# Patient Record
Sex: FEMALE | Race: WHITE | Employment: OTHER | URBAN - METROPOLITAN AREA
[De-identification: names, ages, dates, MRNs, and addresses within clinical notes are randomized per-mention and may not be internally consistent; named-entity substitution may affect disease eponyms.]

---

## 2020-12-08 ENCOUNTER — HOSPITAL ENCOUNTER (EMERGENCY)
Age: 60
Discharge: HOME | End: 2020-12-08
Attending: EMERGENCY MEDICINE
Payer: COMMERCIAL

## 2020-12-08 VITALS
DIASTOLIC BLOOD PRESSURE: 72 MMHG | HEART RATE: 81 BPM | HEIGHT: 65 IN | WEIGHT: 165.34 LBS | BODY MASS INDEX: 27.55 KG/M2 | TEMPERATURE: 98 F | RESPIRATION RATE: 10 BRPM | SYSTOLIC BLOOD PRESSURE: 105 MMHG | OXYGEN SATURATION: 94 %

## 2020-12-08 DIAGNOSIS — L50.9 URTICARIA: ICD-10-CM

## 2020-12-08 DIAGNOSIS — R22.0 LIP SWELLING: ICD-10-CM

## 2020-12-08 DIAGNOSIS — T78.40XA ALLERGIC REACTION, INITIAL ENCOUNTER: Primary | ICD-10-CM

## 2020-12-08 PROCEDURE — 99283 EMERGENCY DEPT VISIT LOW MDM: CPT

## 2020-12-08 PROCEDURE — 74011250636 HC RX REV CODE- 250/636: Performed by: EMERGENCY MEDICINE

## 2020-12-08 PROCEDURE — 96375 TX/PRO/DX INJ NEW DRUG ADDON: CPT

## 2020-12-08 PROCEDURE — 74011250637 HC RX REV CODE- 250/637: Performed by: EMERGENCY MEDICINE

## 2020-12-08 PROCEDURE — 96374 THER/PROPH/DIAG INJ IV PUSH: CPT

## 2020-12-08 RX ORDER — ESCITALOPRAM OXALATE 20 MG/1
TABLET ORAL
COMMUNITY
Start: 2020-12-02

## 2020-12-08 RX ORDER — DEXAMETHASONE SODIUM PHOSPHATE 4 MG/ML
10 INJECTION, SOLUTION INTRA-ARTICULAR; INTRALESIONAL; INTRAMUSCULAR; INTRAVENOUS; SOFT TISSUE
Status: COMPLETED | OUTPATIENT
Start: 2020-12-08 | End: 2020-12-08

## 2020-12-08 RX ORDER — DIPHENHYDRAMINE HYDROCHLORIDE 50 MG/ML
50 INJECTION, SOLUTION INTRAMUSCULAR; INTRAVENOUS
Status: COMPLETED | OUTPATIENT
Start: 2020-12-08 | End: 2020-12-08

## 2020-12-08 RX ORDER — FAMOTIDINE 20 MG/1
20 TABLET, FILM COATED ORAL
Status: COMPLETED | OUTPATIENT
Start: 2020-12-08 | End: 2020-12-08

## 2020-12-08 RX ADMIN — DIPHENHYDRAMINE HYDROCHLORIDE 50 MG: 50 INJECTION, SOLUTION INTRAMUSCULAR; INTRAVENOUS at 00:51

## 2020-12-08 RX ADMIN — DEXAMETHASONE SODIUM PHOSPHATE 10 MG: 4 INJECTION, SOLUTION INTRAMUSCULAR; INTRAVENOUS at 00:54

## 2020-12-08 RX ADMIN — FAMOTIDINE 20 MG: 20 TABLET ORAL at 00:50

## 2020-12-08 NOTE — ED NOTES
Patient  given copy of dc instructions. Patient verbalized understanding of instructions. Patient alert and oriented and in no acute distress. Patient discharged home ambulatory with spouse.

## 2020-12-08 NOTE — ED TRIAGE NOTES
Triage note;C/O ALLERGIC REACTION THAT STARTED AT 2300 HAVING HANDS ITCHING WITH LIP SWELLING AND THROAT SWELLING. AIRWAY OPEN. USED A NEW FACE CREAM.

## 2020-12-08 NOTE — ED PROVIDER NOTES
62 y.o. female with no significant past medical history with known allergy to contrast dye and mushrooms presents to the emergency department stating that around 11 PM she developed itching and swelling to her hands as well as a sensation of lip swelling and throat swelling. She states that she recently purchased a new face cream that she applied with her hands and states that her symptoms seem to just be isolated to her hands and face and denies any other rashes anywhere else on her body. She denies any significant shortness of breath on arrival, no nausea, vomiting, abdominal pain. She states that she took a dose of old, reportedly  Benadryl shortly after the onset of her symptoms to no avail. She denies any other new foods or other potential new exposures. Past Medical History:   Diagnosis Date    Psychiatric disorder        Past Surgical History:   Procedure Laterality Date    HX CHOLECYSTECTOMY      HX GYN      HX ORTHOPAEDIC           History reviewed. No pertinent family history. Social History     Socioeconomic History    Marital status:      Spouse name: Not on file    Number of children: Not on file    Years of education: Not on file    Highest education level: Not on file   Occupational History    Not on file   Social Needs    Financial resource strain: Not on file    Food insecurity     Worry: Not on file     Inability: Not on file    Transportation needs     Medical: Not on file     Non-medical: Not on file   Tobacco Use    Smoking status: Current Every Day Smoker     Packs/day: 1.00     Years: 15.00     Pack years: 15.00    Smokeless tobacco: Current User   Substance and Sexual Activity    Alcohol use:  Yes     Alcohol/week: 14.0 standard drinks     Types: 14 Glasses of wine per week    Drug use: Never    Sexual activity: Not on file   Lifestyle    Physical activity     Days per week: Not on file     Minutes per session: Not on file    Stress: Not on file   Relationships    Social connections     Talks on phone: Not on file     Gets together: Not on file     Attends Synagogue service: Not on file     Active member of club or organization: Not on file     Attends meetings of clubs or organizations: Not on file     Relationship status: Not on file    Intimate partner violence     Fear of current or ex partner: Not on file     Emotionally abused: Not on file     Physically abused: Not on file     Forced sexual activity: Not on file   Other Topics Concern    Not on file   Social History Narrative    Not on file         ALLERGIES: Contrast dye [iodine] and Mushroom    Review of Systems   Constitutional: Positive for activity change. Negative for appetite change, chills and fever. HENT: Positive for facial swelling. Negative for congestion, rhinorrhea, sinus pressure, sneezing and sore throat. Eyes: Negative for photophobia and visual disturbance. Respiratory: Negative for cough and shortness of breath. Cardiovascular: Negative for chest pain. Gastrointestinal: Negative for abdominal pain, blood in stool, constipation, diarrhea, nausea and vomiting. Genitourinary: Negative for difficulty urinating, dysuria, flank pain, frequency, hematuria, menstrual problem, urgency, vaginal bleeding and vaginal discharge. Musculoskeletal: Negative for arthralgias, back pain, myalgias and neck pain. Skin: Positive for rash. Negative for wound. Neurological: Negative for syncope, weakness, numbness and headaches. Psychiatric/Behavioral: Negative for self-injury and suicidal ideas. All other systems reviewed and are negative. Vitals:    12/08/20 0035   BP: (!) 156/88   Pulse: 89   Resp: 16   Temp: 98 °F (36.7 °C)   SpO2: 96%   Weight: 75 kg (165 lb 5.5 oz)   Height: 5' 5\" (1.651 m)            Physical Exam  Vitals signs and nursing note reviewed. Constitutional:       General: She is not in acute distress. Appearance: Normal appearance.  She is well-developed. She is not diaphoretic. Comments: Pleasant, no acute distress. HENT:      Head: Normocephalic and atraumatic. Nose: Nose normal.      Mouth/Throat:      Comments: Mild lip and tongue swelling but no significant posterior oropharyngeal edema or uvula swelling. Uvula midline, airway patent, no trismus, fluent speech, no stridor. Eyes:      Extraocular Movements: Extraocular movements intact. Conjunctiva/sclera: Conjunctivae normal.      Pupils: Pupils are equal, round, and reactive to light. Neck:      Musculoskeletal: Neck supple. Cardiovascular:      Rate and Rhythm: Normal rate and regular rhythm. Heart sounds: Normal heart sounds. Pulmonary:      Effort: Pulmonary effort is normal. No respiratory distress. Breath sounds: Normal breath sounds. No stridor. No wheezing. Abdominal:      General: There is no distension. Palpations: Abdomen is soft. Tenderness: There is no abdominal tenderness. Musculoskeletal:         General: No tenderness. Skin:     General: Skin is warm and dry. Comments: Mild redness and swelling of her hands with some small scattered areas of excoriation from scratching to the dorsum of her right hand   Neurological:      General: No focal deficit present. Mental Status: She is alert and oriented to person, place, and time. Cranial Nerves: No cranial nerve deficit. Sensory: No sensory deficit. Motor: No weakness. Coordination: Coordination normal.          MDM    62 y.o. female presents to the ED with allergic reaction with associated lip and facial and reported throat swelling. Exam reassuring, as above. She is afebrile with VSS with normal blood pressure. She was given a dose of benadryl, pepcid and decadron and was monitored in the ED.  She stated that she had significant improvement in her sx after these medications and had no clinical worsening of sx. she was recommended to follow-up with allergy/immunology for further evaluation and allergy skin testing in was advised to no longer use her new face cream as this was likely the precipitant of her reaction. Recommended to continue to use Benadryl as needed for treatment of itching or other allergic type symptoms as needed. Strict return precautions were given for worsening or concerns. This plan was discussed with the patient and her  at the bedside and they stated both understanding and agreement.     Procedures

## 2022-10-07 ENCOUNTER — OFFICE VISIT (OUTPATIENT)
Dept: ORTHOPEDIC SURGERY | Age: 62
End: 2022-10-07
Payer: COMMERCIAL

## 2022-10-07 VITALS
TEMPERATURE: 96.9 F | DIASTOLIC BLOOD PRESSURE: 86 MMHG | BODY MASS INDEX: 27.16 KG/M2 | HEART RATE: 80 BPM | SYSTOLIC BLOOD PRESSURE: 143 MMHG | WEIGHT: 163 LBS | HEIGHT: 65 IN | OXYGEN SATURATION: 98 %

## 2022-10-07 DIAGNOSIS — M79.89 MASS OF SOFT TISSUE OF PELVIS: ICD-10-CM

## 2022-10-07 DIAGNOSIS — M16.12 UNILATERAL PRIMARY OSTEOARTHRITIS, LEFT HIP: Primary | ICD-10-CM

## 2022-10-07 DIAGNOSIS — R19.00 MASS OF PELVIS: ICD-10-CM

## 2022-10-07 PROCEDURE — 20611 DRAIN/INJ JOINT/BURSA W/US: CPT | Performed by: ORTHOPAEDIC SURGERY

## 2022-10-07 PROCEDURE — 99204 OFFICE O/P NEW MOD 45 MIN: CPT | Performed by: ORTHOPAEDIC SURGERY

## 2022-10-07 RX ORDER — HYDROXYZINE 25 MG/1
TABLET, FILM COATED ORAL
COMMUNITY
Start: 2022-02-01

## 2022-10-07 RX ORDER — ASPIRIN 81 MG/1
TABLET ORAL DAILY
COMMUNITY

## 2022-10-07 RX ORDER — BETAMETHASONE SODIUM PHOSPHATE AND BETAMETHASONE ACETATE 3; 3 MG/ML; MG/ML
6 INJECTION, SUSPENSION INTRA-ARTICULAR; INTRALESIONAL; INTRAMUSCULAR; SOFT TISSUE ONCE
Status: COMPLETED | OUTPATIENT
Start: 2022-10-07 | End: 2022-10-07

## 2022-10-07 RX ORDER — PANTOPRAZOLE SODIUM 40 MG/1
40 TABLET, DELAYED RELEASE ORAL DAILY
COMMUNITY
Start: 2022-08-29

## 2022-10-07 RX ORDER — CETIRIZINE HYDROCHLORIDE 10 MG/1
CAPSULE, LIQUID FILLED ORAL
COMMUNITY

## 2022-10-07 RX ORDER — BISMUTH SUBSALICYLATE 262 MG
1 TABLET,CHEWABLE ORAL DAILY
COMMUNITY

## 2022-10-07 RX ADMIN — BETAMETHASONE SODIUM PHOSPHATE AND BETAMETHASONE ACETATE 6 MG: 3; 3 INJECTION, SUSPENSION INTRA-ARTICULAR; INTRALESIONAL; INTRAMUSCULAR; SOFT TISSUE at 09:05

## 2022-10-07 NOTE — PROGRESS NOTES
Identified pt with two pt identifiers (name and ). Reviewed chart in preparation for visit and have obtained necessary documentation. Veda Bailey is a 65 y.o. female  Chief Complaint   Patient presents with    Hip Pain     Bilateral hip     Visit Vitals  BP (!) 143/86 (BP 1 Location: Right arm, BP Patient Position: Sitting, BP Cuff Size: Adult)   Pulse 80   Temp 96.9 °F (36.1 °C) (Tympanic)   Ht 5' 5\" (1.651 m)   Wt 163 lb (73.9 kg)   SpO2 98%   BMI 27.12 kg/m²     1. Have you been to the ER, urgent care clinic since your last visit? Hospitalized since your last visit? No    2. Have you seen or consulted any other health care providers outside of the [address] [place] [address] since your last visit? Include any pap smears or colon screening.  No

## 2022-10-07 NOTE — LETTER
10/7/2022    Patient: Louie Milligan   YOB: 1960   Date of Visit: 10/7/2022     Abad Ackerman, [id number] [address] [address]  Via Fax: [phone]    Dear Abad Ackerman MD,      Thank you for referring Ms. José Manuel Perez to [facility] for evaluation. My notes for this consultation are attached. If you have questions, please do not hesitate to call me. I look forward to following your patient along with you.       Sincerely,    Kimberly Phan, DO

## 2022-10-07 NOTE — PROGRESS NOTES
10/7/2022    Chief Complaint: Left hip pain    Assessment: Unilateral primary osteoarthritis left hip    Plan: This patient does have symptomatic hip osteoarthritis. We did discuss the general management of osteoarthritis, which is largely nonsurgical.   We discussed that activity modification, weight loss, weight bearing exercises, physical therapy, over the counter medications, prescription medications, ambulatory aids, intra-articular injections, and pain management modalities are the treatment of choice. Only once these fail, would we consider surgery. The patient stated their understanding of the pathology, as well as their choices. We decided to proceed with injection, mri of pelvis with and without contrast.    Follow up: one week    HPI: This is a 65y.o. year old female who  complains of left hip pain which is activity dependent. The patient has had activity dependent pain for years. The patient has tried activity modification, she's in physical therapy, injections have not been attempted. The pain is in the groin , it is severe in intensity. The pain is in the groin and causes  limitation in the normal activities of daily living. The patient denies any numbness, weakness, tingling, fevers, chills, nausea, vomiting, fevers, chills, nausea, vomiting. Past Medical History:   Diagnosis Date    Psychiatric disorder        Past Surgical History:   Procedure Laterality Date    HX CHOLECYSTECTOMY      HX GYN      HX ORTHOPAEDIC         Current Outpatient Medications on File Prior to Visit   Medication Sig Dispense Refill    hydrOXYzine HCL (ATARAX) 25 mg tablet 1      pantoprazole (PROTONIX) 40 mg tablet Take 40 mg by mouth daily. Cetirizine (ZyrTEC) 10 mg cap Take  by mouth. aspirin delayed-release 81 mg tablet Take  by mouth daily. multivitamin (ONE A DAY) tablet Take 1 Tablet by mouth daily.       escitalopram oxalate (LEXAPRO) 20 mg tablet TAKE 1 TABLET BY MOUTH EVERY DAY No current facility-administered medications on file prior to visit. Allergies   Allergen Reactions    Codeine Sulfate Unknown (comments)     Chest tightness    Contrast Dye [Iodine] Shortness of Breath    Mushroom Itching       History reviewed. No pertinent family history. Social History     Socioeconomic History    Marital status:    Tobacco Use    Smoking status: Every Day     Packs/day: 1.00     Years: 15.00     Pack years: 15.00     Types: Cigarettes    Smokeless tobacco: Current   Substance and Sexual Activity    Alcohol use: Yes     Alcohol/week: 14.0 standard drinks     Types: 14 Glasses of wine per week    Drug use: Never         Review of Systems:       General: Denies headache, lethargy, fever, weight loss  Ears/Nose/Throat: Denies ear discharge, drainage, nosebleeds, hoarse voice, dental problems  Cardiovascular: Denies chest pain, shortness of breath  Lungs: Denies chest pain, breathing problems, wheezing, pneumonia  Stomach: Denies stomach pain, heartburn, constipation, irritable bowel  Skin: Denies rash, sores, open wounds  Musculoskeletal: Admits to left hip pain  Genitourinary: Denies dysuria, hematuria, polyuria  Gastrointestinal: Denies constipation, obstipation, diarrhea  Neurological: Denies changes in sight, smell, hearing, taste, seizures. Denies loss of consciousness.   Psychiatric: Denies depression, sleep pattern changes, anxiety, change in personality  Endocrine: Denies mood swings, heat or cold intolerance  Hematologic/Lymphatic: Denies anemia, purpura, petechia  Allergic/Immunologic: Denies swelling of throat, pain or swelling at lymph nodes      Physical Examination:    Visit Vitals  BP (!) 143/86 (BP 1 Location: Right arm, BP Patient Position: Sitting, BP Cuff Size: Adult)   Pulse 80   Temp 96.9 °F (36.1 °C) (Tympanic)   Ht 5' 5\" (1.651 m)   Wt 163 lb (73.9 kg)   SpO2 98%   BMI 27.12 kg/m²        General: AOX3, no apparent distress  Psychiatric: mood and affect appropriate  Lungs: breathing is symmetric and unlabored bilaterally  Heart: regular rate and rhythm  Abdomen: no guarding  Head: normocephalic, atraumatic  Skin: No significant abnormalities, good turgor  Sensation intact to light touch: L1-S1 dermatomes  Muscular exam: 5/5 strength in all major muscle groups unless noted in specialty exam.    Extremities    Left upper extremity: Full active and passive range of motion without pain, deformity, no open wound, strength 5/5 in all major muscle groups. Right upper extremity: Full active and passive range of motion without pain, deformity, no open wound, strength 5/5 in all major muscle groups. Right lower extremity: Full active and passive range of motion without pain, deformity, no open wound, strength 5/5 in all major muscle groups. Left lower extremity:  No deformity is noted. Circumduction of the hip causes  pain in the groin, reproductive of the chief complaint. Range of motion is limited, with a + impingement sign. ELY test is +. Gait is antalgic.  no tenderness to palpation on the lateral aspect of the hip. Sensation is intact to light touch in the L1-S1 dermatomes. Skin is intact about the hip. Hip flexion and abduction strength is 5/5, hip extension and knee extension as well as tibialis anterior and EHL/GCS are 5/5 strength. Diagnostics:    Pertinent Xrays:  Xrays are available of the left hip, they indicate moderate to severe osteoarthritis of the femoroacetabular joint, no significant other findings, no other osseus abnormalities, fractures, or dislocations. Lumbar arthrosis noted. Two small sclerotic lesions of the sacral ala noted. Ms. Rola Calles has a reminder for a \"due or due soon\" health maintenance. I have asked that she contact her primary care provider for follow-up on this health maintenance.            PROCEDURE NOTE:    After consent was obtained, the left hip was visualized under direct ultrasound guidance, utilizing a Sonosite C1-4 probe with 3-5 Hz variable frequency oriented in a longitudinal orientation. Once I had confirmation of direct visualization of the head neck junction, skin at the anterior lateral hip was prepped with chlorhexidine. Under direct visualization, 1% lidocaine was injected into the subcutaneous tissues as well as into the capsule of the hip. Needle remained in place 6 mg of betamethasone as well as 1% lidocaine were injected into the hip joint itself, there was good filling of the capsule itself as noted by direct visualization. Images were saved to the [employer] machine local drive. Once this was completed, the needle was extracted, sterile dressing was applied. The patient tolerated well. Postinjection instructions were given.

## 2022-10-19 ENCOUNTER — VIRTUAL VISIT (OUTPATIENT)
Dept: ORTHOPEDIC SURGERY | Age: 62
End: 2022-10-19
Payer: COMMERCIAL

## 2022-10-19 DIAGNOSIS — M16.12 UNILATERAL PRIMARY OSTEOARTHRITIS, LEFT HIP: Primary | ICD-10-CM

## 2022-10-19 PROCEDURE — 99441 PR PHYS/QHP TELEPHONE EVALUATION 5-10 MIN: CPT | Performed by: ORTHOPAEDIC SURGERY

## 2022-10-19 NOTE — PROGRESS NOTES
10/19/2022      CC: left hip pain    HPI:      This is a 65y.o. year old female who presents for follow up of their left hip injection. The patient states that they have had very good relief of their pain. The time since injection has been approximately a week. PMH:  Past Medical History:   Diagnosis Date    Psychiatric disorder        PSxHx:  Past Surgical History:   Procedure Laterality Date    HX CHOLECYSTECTOMY      HX GYN      HX ORTHOPAEDIC         Meds:    Current Outpatient Medications:     hydrOXYzine HCL (ATARAX) 25 mg tablet, 1, Disp: , Rfl:     pantoprazole (PROTONIX) 40 mg tablet, Take 40 mg by mouth daily. , Disp: , Rfl:     Cetirizine (ZyrTEC) 10 mg cap, Take  by mouth., Disp: , Rfl:     aspirin delayed-release 81 mg tablet, Take  by mouth daily. , Disp: , Rfl:     multivitamin (ONE A DAY) tablet, Take 1 Tablet by mouth daily. , Disp: , Rfl:     escitalopram oxalate (LEXAPRO) 20 mg tablet, TAKE 1 TABLET BY MOUTH EVERY DAY, Disp: , Rfl:     All:  Allergies   Allergen Reactions    Codeine Sulfate Unknown (comments)     Chest tightness    Contrast Dye [Iodine] Shortness of Breath    Mushroom Itching       Social Hx:  Social History     Socioeconomic History    Marital status:    Tobacco Use    Smoking status: Every Day     Packs/day: 1.00     Years: 15.00     Pack years: 15.00     Types: Cigarettes    Smokeless tobacco: Current   Substance and Sexual Activity    Alcohol use: Yes     Alcohol/week: 14.0 standard drinks     Types: 14 Glasses of wine per week    Drug use: Never       Family Hx:  History reviewed. No pertinent family history.       Review of Systems:       General: Denies headache, lethargy, fever, weight loss  Ears/Nose/Throat: Denies ear discharge, drainage, nosebleeds, hoarse voice, dental problems  Cardiovascular: Denies chest pain, shortness of breath  Lungs: Denies chest pain, breathing problems, wheezing, pneumonia  Stomach: Denies stomach pain, heartburn, constipation, irritable bowel  Skin: Denies rash, sores, open wounds  Musculoskeletal: left hip pain - resolved  Genitourinary: Denies dysuria, hematuria, polyuria  Gastrointestinal: Denies constipation, obstipation, diarrhea  Neurological: Denies changes in sight, smell, hearing, taste, seizures. Denies loss of consciousness. Psychiatric: Denies depression, sleep pattern changes, anxiety, change in personality  Endocrine: Denies mood swings, heat or cold intolerance  Hematologic/Lymphatic: Denies anemia, purpura, petechia  Allergic/Immunologic: Denies swelling of throat, pain or swelling at lymph nodes      Physical Examination:    There were no vitals taken for this visit. General: AOX3, no apparent distress  Psychiatric: mood and affect appropriate        Diagnostics:    Pertinent Diagnostics: none    Assessment: Status post left hip injection  Plan: This patient and I discussed the normal course for injections, we discussed that pain relief will likely be temporary to some degree, but I cannot predict the longevity of relief. We also discussed the limitations of injections, and that I cannot inject the same area any more often than every three months. We will proceed with continued observation. Patient was in [place] at the time of consultation. I was in the office while conducting this encounter. Consent:  She and/or her healthcare decision maker is aware that this patient-initiated Telehealth encounter is a billable service, with coverage as determined by her insurance carrier. She is aware that she may receive a bill and has provided verbal consent to proceed: Yes    This virtual visit was conducted telephone encounter only. -  I affirm this is a Patient Initiated Episode with an Established Patient who has not had a related appointment within my department in the past 7 days or scheduled within the next 24 hours.   Note: this encounter is not billable if this call serves to triage the patient into an appointment for the relevant concern. Total Time: minutes: 5-10 minutes. Ms. Parker Collins has a reminder for a \"due or due soon\" health maintenance. I have asked that she contact her primary care provider for follow-up on this health maintenance.

## 2022-10-24 ENCOUNTER — HOSPITAL ENCOUNTER (OUTPATIENT)
Dept: MRI IMAGING | Age: 62
Discharge: HOME | End: 2022-10-24
Attending: ORTHOPAEDIC SURGERY
Payer: COMMERCIAL

## 2022-10-24 DIAGNOSIS — R19.00 MASS OF PELVIS: ICD-10-CM

## 2022-10-24 PROCEDURE — 72197 MRI PELVIS W/O & W/DYE: CPT

## 2022-10-24 PROCEDURE — A9576 INJ PROHANCE MULTIPACK: HCPCS | Performed by: ORTHOPAEDIC SURGERY

## 2022-10-24 PROCEDURE — 74011250636 HC RX REV CODE- 250/636: Performed by: ORTHOPAEDIC SURGERY

## 2022-10-24 RX ADMIN — GADOTERIDOL 15 ML: 279.3 INJECTION, SOLUTION INTRAVENOUS at 20:23

## 2022-11-01 ENCOUNTER — VIRTUAL VISIT (OUTPATIENT)
Dept: ORTHOPEDIC SURGERY | Age: 62
End: 2022-11-01
Payer: COMMERCIAL

## 2022-11-01 DIAGNOSIS — M16.12 UNILATERAL PRIMARY OSTEOARTHRITIS, LEFT HIP: Primary | ICD-10-CM

## 2022-11-01 PROCEDURE — 99213 OFFICE O/P EST LOW 20 MIN: CPT | Performed by: ORTHOPAEDIC SURGERY

## 2022-11-01 RX ORDER — DICLOFENAC SODIUM 50 MG/1
50 TABLET, DELAYED RELEASE ORAL 3 TIMES DAILY
Qty: 60 TABLET | Refills: 1 | Status: SHIPPED | OUTPATIENT
Start: 2022-11-01

## 2022-11-01 NOTE — PROGRESS NOTES
11/1/2022      CC: Left hip pain    HPI:      This is a 65y.o. year old female who does have left hip osteoarthritis, this is severe, we did discuss MRI regarding possible lesion in the sacral area, she is here for review of that, she is now having return of her symptoms of hip arthritis. PMH:  Past Medical History:   Diagnosis Date    Psychiatric disorder        PSxHx:  Past Surgical History:   Procedure Laterality Date    HX CHOLECYSTECTOMY      HX GYN      HX ORTHOPAEDIC         Meds:    Current Outpatient Medications:     diclofenac EC (VOLTAREN) 50 mg EC tablet, Take 1 Tablet by mouth three (3) times daily. , Disp: 60 Tablet, Rfl: 1    hydrOXYzine HCL (ATARAX) 25 mg tablet, 1, Disp: , Rfl:     pantoprazole (PROTONIX) 40 mg tablet, Take 40 mg by mouth daily. , Disp: , Rfl:     Cetirizine (ZyrTEC) 10 mg cap, Take  by mouth., Disp: , Rfl:     aspirin delayed-release 81 mg tablet, Take  by mouth daily. , Disp: , Rfl:     multivitamin (ONE A DAY) tablet, Take 1 Tablet by mouth daily. , Disp: , Rfl:     escitalopram oxalate (LEXAPRO) 20 mg tablet, TAKE 1 TABLET BY MOUTH EVERY DAY, Disp: , Rfl:     All:  Allergies   Allergen Reactions    Codeine Sulfate Unknown (comments)     Chest tightness    Contrast Dye [Iodine] Shortness of Breath    Mushroom Itching       Social Hx:  Social History     Socioeconomic History    Marital status:    Tobacco Use    Smoking status: Every Day     Packs/day: 1.00     Years: 15.00     Pack years: 15.00     Types: Cigarettes    Smokeless tobacco: Current   Substance and Sexual Activity    Alcohol use: Yes     Alcohol/week: 14.0 standard drinks     Types: 14 Glasses of wine per week    Drug use: Never       Family Hx:  No family history on file.       Review of Systems:       General: Denies headache, lethargy, fever, weight loss  Ears/Nose/Throat: Denies ear discharge, drainage, nosebleeds, hoarse voice, dental problems  Cardiovascular: Denies chest pain, shortness of breath  Lungs: Denies chest pain, breathing problems, wheezing, pneumonia  Stomach: Denies stomach pain, heartburn, constipation, irritable bowel  Skin: Denies rash, sores, open wounds  Musculoskeletal: Left hip pain  Genitourinary: Denies dysuria, hematuria, polyuria  Gastrointestinal: Denies constipation, obstipation, diarrhea  Neurological: Denies changes in sight, smell, hearing, taste, seizures. Denies loss of consciousness. Psychiatric: Denies depression, sleep pattern changes, anxiety, change in personality  Endocrine: Denies mood swings, heat or cold intolerance  Hematologic/Lymphatic: Denies anemia, purpura, petechia  Allergic/Immunologic: Denies swelling of throat, pain or swelling at lymph nodes      Physical Examination:    There were no vitals taken for this visit. General: AOX3, no apparent distress  Psychiatric: mood and affect appropriate        Diagnostics:    Pertinent Diagnostics: MRI ordered and reviewed by myself of the pelvis indicates 1 sacral alar mass, there is no reactive edema, nonspecific change in the intensity. Left hip osteoarthritis is noted. Assessment: Left hip osteoarthritis, sacral alar mass of unknown origin, no reactive edema or adenopathy  Plan: This patient I had a long discussion regarding her treatment options for her hip, in general, she is a candidate for hip replacement anytime. Additionally, for the sacral findings, I will coordinate care and discussed this with the radiology team to ensure that there is nothing that we are missing. I will also prescribe diclofenac for her, new prescription given today. Follow-up with me as needed. She was in [place] at the time of consultation. I was in the office while conducting this encounter. Consent:  She and/or her healthcare decision maker is aware that this patient-initiated Telehealth encounter is a billable service, with coverage as determined by her insurance carrier.  She is aware that she may receive a leandra and has provided verbal consent to proceed: Yes    This virtual visit was conducted via [employer]. Pursuant to the emergency declaration under the [address] [place] [address] waiver authority and the [employer] Act, this Virtual  Visit was conducted to reduce the patient's risk of exposure to COVID-19 and provide continuity of care for an established patient. Services were provided through a video synchronous discussion virtually to substitute for in-person clinic visit. Due to this being a TeleHealth evaluation, many elements of the physical examination are unable to be assessed. Total Time: minutes: 21-30 minutes. Ms. Boone Baker has a reminder for a \"due or due soon\" health maintenance. I have asked that she contact her primary care provider for follow-up on this health maintenance.

## 2022-12-12 RX ORDER — DICLOFENAC SODIUM 50 MG/1
TABLET, DELAYED RELEASE ORAL
Qty: 60 TABLET | Refills: 1 | Status: SHIPPED | OUTPATIENT
Start: 2022-12-12

## 2023-01-11 ENCOUNTER — OFFICE VISIT (OUTPATIENT)
Dept: ORTHOPEDIC SURGERY | Age: 63
End: 2023-01-11
Payer: COMMERCIAL

## 2023-01-11 VITALS
WEIGHT: 174 LBS | HEIGHT: 65 IN | DIASTOLIC BLOOD PRESSURE: 77 MMHG | SYSTOLIC BLOOD PRESSURE: 103 MMHG | BODY MASS INDEX: 28.99 KG/M2 | HEART RATE: 88 BPM | OXYGEN SATURATION: 98 % | TEMPERATURE: 97.3 F

## 2023-01-11 DIAGNOSIS — M16.12 UNILATERAL PRIMARY OSTEOARTHRITIS, LEFT HIP: Primary | ICD-10-CM

## 2023-01-11 PROCEDURE — 99213 OFFICE O/P EST LOW 20 MIN: CPT | Performed by: ORTHOPAEDIC SURGERY

## 2023-01-11 RX ORDER — TRAMADOL HYDROCHLORIDE 50 MG/1
50 TABLET ORAL
Qty: 28 TABLET | Refills: 0 | Status: SHIPPED | OUTPATIENT
Start: 2023-01-11 | End: 2023-01-18

## 2023-01-11 NOTE — PROGRESS NOTES
Identified pt with two pt identifiers (name and ). Reviewed chart in preparation for visit and have obtained necessary documentation. Veda Bailey is a 65 y.o. female  Chief Complaint   Patient presents with    Hip Pain     LT Hip follow up     Visit Vitals  /77 (BP 1 Location: Right arm, BP Patient Position: Sitting, BP Cuff Size: Large adult)   Pulse 88   Temp 97.3 °F (36.3 °C) (Tympanic)   Ht 5' 5\" (1.651 m)   Wt 174 lb (78.9 kg)   SpO2 98%   BMI 28.96 kg/m²     1. Have you been to the ER, urgent care clinic since your last visit? Hospitalized since your last visit? No    2. Have you seen or consulted any other health care providers outside of the [address] [place] [address] since your last visit? Include any pap smears or colon screening.  No

## 2023-01-11 NOTE — PROGRESS NOTES
1/11/2023    Chief Complaint: Left hip pain    Assessment: Unilateral primary osteoarthritis of the left hip  (M16.12)     Plan: This patient and I did have a long discussion regarding the treatment options. Nonoperative management was discussed at length, continued pain control, physical therapy, injections, ambulatory aids, and weight loss. I did speak with the patient specifically that there are always some nonoperative options, and that surgery would be elective on their part. We did discuss the risks of surgery which include but are not limited to infection, nerve or blood vessel damage, femoral, lateral femoral cutaneous nerve injury, sciatic nerve injury, failure of fixation, failure of any possible implant, need for reoperation, postoperative pain and swelling, intra-or postoperative fracture, postoperative dislocation, leg length inequality, need for reoperation, implant failure, death, disability, organ dysfunction, wound healing issues, DVT, PE, and the need for further procedures. The patient did freely state their understanding and satisfaction with our discussion. We will proceed with a total hip arthroplasty after  medical clearances. The patient was counseled at length about the risks of abdoul Covid-19 during their perioperative period and any recovery window from their procedure. The patient was made aware that abdoul Covid-19  may worsen their prognosis for recovering from their procedure and lend to a higher morbidity and/or mortality risk. All material risks, benefits, and reasonable alternatives including postponing the procedure were discussed. The patient does  wish to proceed with the procedure at this time. HPI: This is a 65 y. o.female who complains of left hip pain which is activity dependent. The patient has had activity dependent pain for years. The patient has tried activity modification, she's tried physical therapy, injections have failed.   The pain is in the groin and thigh, it is severe in intensity. The patient fears falling, walks with a limp, and feels as though all nonoperative management has failed. The patient denies any numbness, weakness, tingling, fevers, chills, nausea, vomiting, fevers, chills, nausea, vomiting. Past Medical History:   Diagnosis Date    Psychiatric disorder        Past Surgical History:   Procedure Laterality Date    HX CHOLECYSTECTOMY      HX GYN      HX ORTHOPAEDIC         Current Outpatient Medications on File Prior to Visit   Medication Sig Dispense Refill    diclofenac EC (VOLTAREN) 50 mg EC tablet TAKE 1 TABLET BY MOUTH THREE TIMES A DAY 60 Tablet 1    hydrOXYzine HCL (ATARAX) 25 mg tablet 1      pantoprazole (PROTONIX) 40 mg tablet Take 40 mg by mouth daily. Cetirizine (ZyrTEC) 10 mg cap Take  by mouth. aspirin delayed-release 81 mg tablet Take  by mouth daily. multivitamin (ONE A DAY) tablet Take 1 Tablet by mouth daily. escitalopram oxalate (LEXAPRO) 20 mg tablet TAKE 1 TABLET BY MOUTH EVERY DAY       No current facility-administered medications on file prior to visit. Allergies   Allergen Reactions    Codeine Sulfate Unknown (comments)     Chest tightness    Contrast Dye [Iodine] Shortness of Breath    Mushroom Itching       History reviewed. No pertinent family history. Social History     Socioeconomic History    Marital status:    Tobacco Use    Smoking status: Every Day     Packs/day: 1.00     Years: 15.00     Pack years: 15.00     Types: Cigarettes    Smokeless tobacco: Current   Substance and Sexual Activity    Alcohol use:  Yes     Alcohol/week: 14.0 standard drinks     Types: 14 Glasses of wine per week    Drug use: Never         Review of Systems:       General: Denies headache, lethargy, fever, weight loss  Ears/Nose/Throat: Denies ear discharge, drainage, nosebleeds, hoarse voice, dental problems  Cardiovascular: Denies chest pain, shortness of breath  Lungs: Denies chest pain, breathing problems, wheezing, pneumonia  Stomach: Denies stomach pain, heartburn, constipation, irritable bowel  Skin: Denies rash, sores, open wounds  Musculoskeletal: Admits to joint pain and swelling, this pain is sharp and causes difficulty walking. This pain is in the groin on the left side, it is severe, made better by rest.   This pain causes a limp, admits to stiffness of the joint, decreased mobility, and  difficulty doing normal activities of daily living secondary to the pain. Genitourinary: Denies dysuria, hematuria, polyuria  Gastrointestinal: Denies constipation, obstipation, diarrhea  Neurological: Denies changes in sight, smell, hearing, taste, seizures. Denies loss of consciousness. Psychiatric: Denies depression, sleep pattern changes, anxiety, change in personality  Endocrine: Denies mood swings, heat or cold intolerance  Hematologic/Lymphatic: Denies anemia, purpura, petechia  Allergic/Immunologic: Denies swelling of throat, pain or swelling at lymph nodes      Physical Examination:      General: AOX3, no apparent distress  Psychiatric: mood and affect appropriate  Lungs: clear to auscultation bilaterally  Heart: regular rate and rhythm  Abdomen: no guarding  Head: normocephalic, atraumatic  Skin: No significant abnormalities, good turgor  Sensation intact to light touch: L1-S1 dermatomes  Muscular exam: 5/5 strength in all major muscle groups unless noted in specialty exam.    Extremities      Left upper extremity: Full active and passive range of motion without pain, deformity, no open wound, strength 5/5 in all major muscle groups. Right upper extremity: Full active and passive range of motion without pain, deformity, no open wound, strength 5/5 in all major muscle groups. Right lower extremity: Full active and passive range of motion without pain, deformity, no open wound, strength 5/5 in all major muscle groups. Left lower extremity:  No deformity is noted.   Circumduction of the hip causes significant pain in the groin, reproductive of the chief complaint. Range of motion is limited, with a positive impingement sign. ELY test causes some pain in the groin. Gait is antalgic. Slight tenderness to palpation on the lateral aspect of the hip. Sensation is intact to light touch in the L1-S1 dermatomes. Skin is intact about the hip. Hip flexion and abduction strength is 4+/5, hip extension and knee extension as well as tibialis anterior and EHL/GCS are 5/5 strength. Diagnostics:    Pertinent Xrays:  Pelvis and hip xrays indicate severe osteoarthritis of the left hip. There are osteophytes at the femoral neck and head, as well as subchondral sclerosis of the acetabular rim. Ms. He Morgan has a reminder for a \"due or due soon\" health maintenance. I have asked that she contact her primary care provider for follow-up on this health maintenance.

## 2023-01-12 ENCOUNTER — TELEPHONE (OUTPATIENT)
Dept: ORTHOPEDIC SURGERY | Age: 63
End: 2023-01-12

## 2023-01-12 NOTE — TELEPHONE ENCOUNTER
Contacted patient to schedule surgery. Scheduled for 2/13/23. Advised patient that clearances from Dr. Bria Holm would be required, and would need to be received no less than 2 business days before surgery. Patient advised to contact the office(s) to make pre op appts as soon as possible. Patient verbalized understanding and was encouraged to contact our office with any questions or concerns regarding upcoming surgery or required clearances. Clearance letters faxed to [phone].  Confirmation was received.       ----- Message from Zonia Vences DO sent at 1/11/2023  4:09 PM EST -----  Diagnosis: Unilateral Primary Osteoarthritis, left hip (M16.2)   Procedure: Left total hip arthroplasty  CPT: 25516  Operative minutes: 100  Inpatient  Location: [facility] Main OR  PAT: Yes  Class: Yes  Special Equipment: C arm (12 inch), HANA table, Direct Anterior total Hip, Juan Pablo Accolade II  Staffing: Assistant/retractor evangelista  Anesthesia: spinal

## 2023-01-16 ENCOUNTER — TELEPHONE (OUTPATIENT)
Dept: ORTHOPEDIC SURGERY | Age: 63
End: 2023-01-16

## 2023-01-16 NOTE — TELEPHONE ENCOUNTER
Patient is scheduled for surgery on 2/13/23 she is hoping to move the surgery to an earlier date. [phone] is the patient's preferred number.

## 2023-01-16 NOTE — TELEPHONE ENCOUNTER
Patient is requesting an alternative to the tramadol prescription stating it is not helping with the pain.  Her preferred pharmacy is is [id number]

## 2023-01-17 DIAGNOSIS — M16.12 UNILATERAL PRIMARY OSTEOARTHRITIS, LEFT HIP: Primary | ICD-10-CM

## 2023-01-17 RX ORDER — HYDROCODONE BITARTRATE AND ACETAMINOPHEN 5; 325 MG/1; MG/1
1 TABLET ORAL
Qty: 21 TABLET | Refills: 0 | Status: SHIPPED | OUTPATIENT
Start: 2023-01-17 | End: 2023-01-24

## 2023-01-17 NOTE — TELEPHONE ENCOUNTER
Pt advised of Dr. Seay Brochure advice,\"I sent in norco, she should break it in half to test the dose as it has a relation to codiene, which she has a listed allergy to. \"

## 2023-01-17 NOTE — TELEPHONE ENCOUNTER
Spoke with the patient and provided her the below information. Patient also asked when she would be scheduled for PAT apt and I explained she should receive a phone call a week or so before her surgery to be scheduled for that apt.

## 2023-02-06 ENCOUNTER — HOSPITAL ENCOUNTER (OUTPATIENT)
Dept: PREADMISSION TESTING | Age: 63
Discharge: HOME | End: 2023-02-06
Attending: ORTHOPAEDIC SURGERY
Payer: COMMERCIAL

## 2023-02-06 VITALS
HEART RATE: 80 BPM | SYSTOLIC BLOOD PRESSURE: 113 MMHG | RESPIRATION RATE: 18 BRPM | BODY MASS INDEX: 29.58 KG/M2 | OXYGEN SATURATION: 98 % | TEMPERATURE: 97.6 F | WEIGHT: 173.28 LBS | HEIGHT: 64 IN | DIASTOLIC BLOOD PRESSURE: 82 MMHG

## 2023-02-06 LAB
ABO + RH BLD: NORMAL
ALBUMIN SERPL-MCNC: 3.9 G/DL (ref 3.5–5)
ALBUMIN/GLOB SERPL: 1.1 (ref 1.1–2.2)
ALP SERPL-CCNC: 123 U/L (ref 45–117)
ALT SERPL-CCNC: 52 U/L (ref 12–78)
ANION GAP SERPL CALC-SCNC: 7 MMOL/L (ref 5–15)
APPEARANCE UR: CLEAR
AST SERPL-CCNC: 25 U/L (ref 15–37)
BACTERIA URNS QL MICRO: NEGATIVE /HPF
BILIRUB SERPL-MCNC: 0.9 MG/DL (ref 0.2–1)
BILIRUB UR QL: NEGATIVE
BLOOD GROUP ANTIBODIES SERPL: NORMAL
BUN SERPL-MCNC: 20 MG/DL (ref 6–20)
BUN/CREAT SERPL: 18 (ref 12–20)
CALCIUM SERPL-MCNC: 9.2 MG/DL (ref 8.5–10.1)
CHLORIDE SERPL-SCNC: 107 MMOL/L (ref 97–108)
CO2 SERPL-SCNC: 24 MMOL/L (ref 21–32)
COLOR UR: ABNORMAL
CREAT SERPL-MCNC: 1.11 MG/DL (ref 0.55–1.02)
EPITH CASTS URNS QL MICRO: ABNORMAL /LPF
ERYTHROCYTE [DISTWIDTH] IN BLOOD BY AUTOMATED COUNT: 11.8 % (ref 11.5–14.5)
EST. AVERAGE GLUCOSE BLD GHB EST-MCNC: 117 MG/DL
GLOBULIN SER CALC-MCNC: 3.6 G/DL (ref 2–4)
GLUCOSE SERPL-MCNC: 113 MG/DL (ref 65–100)
GLUCOSE UR STRIP.AUTO-MCNC: NEGATIVE MG/DL
HBA1C MFR BLD: 5.7 % (ref 4–5.6)
HCT VFR BLD AUTO: 42.1 % (ref 35–47)
HGB BLD-MCNC: 13.9 G/DL (ref 11.5–16)
HGB UR QL STRIP: NEGATIVE
HYALINE CASTS URNS QL MICRO: ABNORMAL /LPF (ref 0–2)
INR PPP: 1 (ref 0.9–1.1)
KETONES UR QL STRIP.AUTO: NEGATIVE MG/DL
LEUKOCYTE ESTERASE UR QL STRIP.AUTO: NEGATIVE
MCH RBC QN AUTO: 31.7 PG (ref 26–34)
MCHC RBC AUTO-ENTMCNC: 33 G/DL (ref 30–36.5)
MCV RBC AUTO: 96.1 FL (ref 80–99)
NITRITE UR QL STRIP.AUTO: NEGATIVE
NRBC # BLD: 0 K/UL (ref 0–0.01)
NRBC BLD-RTO: 0 PER 100 WBC
PH UR STRIP: 5.5 (ref 5–8)
PLATELET # BLD AUTO: 284 K/UL (ref 150–400)
PMV BLD AUTO: 10 FL (ref 8.9–12.9)
POTASSIUM SERPL-SCNC: 3.9 MMOL/L (ref 3.5–5.1)
PROT SERPL-MCNC: 7.5 G/DL (ref 6.4–8.2)
PROT UR STRIP-MCNC: NEGATIVE MG/DL
PROTHROMBIN TIME: 10.3 SEC (ref 9–11.1)
RBC # BLD AUTO: 4.38 M/UL (ref 3.8–5.2)
RBC #/AREA URNS HPF: ABNORMAL /HPF (ref 0–5)
SODIUM SERPL-SCNC: 138 MMOL/L (ref 136–145)
SP GR UR REFRACTOMETRY: 1.02
SPECIMEN EXP DATE BLD: NORMAL
UA: UC IF INDICATED,UAUC: ABNORMAL
UROBILINOGEN UR QL STRIP.AUTO: 0.2 EU/DL (ref 0.2–1)
WBC # BLD AUTO: 5.5 K/UL (ref 3.6–11)
WBC URNS QL MICRO: ABNORMAL /HPF (ref 0–4)

## 2023-02-06 PROCEDURE — 85027 COMPLETE CBC AUTOMATED: CPT

## 2023-02-06 PROCEDURE — 80053 COMPREHEN METABOLIC PANEL: CPT

## 2023-02-06 PROCEDURE — 97161 PT EVAL LOW COMPLEX 20 MIN: CPT

## 2023-02-06 PROCEDURE — 85610 PROTHROMBIN TIME: CPT

## 2023-02-06 PROCEDURE — 86900 BLOOD TYPING SEROLOGIC ABO: CPT

## 2023-02-06 PROCEDURE — 83036 HEMOGLOBIN GLYCOSYLATED A1C: CPT

## 2023-02-06 PROCEDURE — 36415 COLL VENOUS BLD VENIPUNCTURE: CPT

## 2023-02-06 PROCEDURE — 81001 URINALYSIS AUTO W/SCOPE: CPT

## 2023-02-06 PROCEDURE — 93005 ELECTROCARDIOGRAM TRACING: CPT

## 2023-02-06 PROCEDURE — 97116 GAIT TRAINING THERAPY: CPT

## 2023-02-06 RX ORDER — IBUPROFEN 200 MG
200 TABLET ORAL
COMMUNITY

## 2023-02-06 RX ORDER — SPIRONOLACTONE 25 MG
TABLET ORAL DAILY
COMMUNITY

## 2023-02-06 NOTE — PERIOP NOTES
[facility]  Joint/Spine Preoperative Instructions        Surgery Date 2/13/23          Time of Arrival to be called at [phone]     1. On the day of your surgery, please report to the Surgical Services Registration Desk and sign in at your designated time. The Surgery Center is located to the right of the Emergency Room. 2. You must have someone with you to drive you home. You should not drive a car for 24 hours following surgery. Please make arrangements for a friend or family member to stay with you for the first 24 hours after your surgery. 3. No food after midnight. Medications morning of surgery should be taken with a sip of water. Please follow pre-surgery drink instructions that were given at your Pre Admission Testing appointment. 4. We recommend you do not drink any alcoholic beverages for 24 hours before and after your surgery. 5. Contact your surgeons office for instructions on the following medications: non-steroidal anti-inflammatory drugs (i.e. Advil, Aleve), vitamins, and supplements. (Some surgeons will want you to stop these medications prior to surgery and others may allow you to take them)  **If you are currently taking Plavix, Coumadin, Aspirin and/or other blood-thinning agents, contact your surgeon for instructions. ** Your surgeon will partner with the physician prescribing these medications to determine if it is safe to stop or if you need to continue taking. Please do not stop taking these medications without instructions from your surgeon    6. Wear comfortable clothes. Wear glasses instead of contacts. Do not bring any money or jewelry. Please bring picture ID, insurance card, and any prearranged co-payment or hospital payment. Do not wear make-up, particularly mascara the morning of your surgery. Do not wear nail polish, particularly if you are having foot /hand surgery.   Wear your hair loose or down, no ponytails, buns, tisha pins or clips. All body piercings must be removed. Please shower with antibacterial soap for three consecutive days before and on the morning of surgery, but do not apply any lotions, powders or deodorants after the shower on the day of surgery. Please use a fresh towels after each shower. Please sleep in clean clothes and change bed linens the night before surgery. Please do not shave for 48 hours prior to surgery. Shaving of the face is acceptable. 7. You should understand that if you do not follow these instructions your surgery may be cancelled. If your physical condition changes (I.e. fever, cold or flu) please contact your surgeon as soon as possible. 8. It is important that you be on time. If a situation occurs where you may be late, please call [phone] (OR Holding Area). 9. If you have any questions and or problems, please call [phone] (Pre-admission Testing). 10. Your surgery time may be subject to change. You will receive a phone call the evening prior with your time of arrival.    11.  If having outpatient surgery, you must have someone to drive you here, stay with you during the duration of your stay, and to drive you home at time of discharge. 12. The following link is for the educational video for patients and/or families. http://moreira-coleman.org/. com/locations/rwrotbgxm-jaqnvqo-yedrbvz/[facility]/educational-materials    Special Instructions: follow per surgeon       TAKE ALL MEDICATIONS THE DAY OF SURGERY EXCEPT: vitamin;, ibuprofen. May take other medications with a sip of water. I understand a pre-operative phone call will be made to verify my surgery time. In the event that I am not available, I give permission for a message to be left on my answering service and/or with another person?   yes         ___________________      __________   _________    (Signature of Patient)             (Witness)                (Date and Time)

## 2023-02-06 NOTE — PERIOP NOTES

## 2023-02-06 NOTE — ADVANCED PRACTICE NURSE
PAT Nurse Practitioner   Pre-Operative Chart Review/Assessment:-ORTHOPEDIC/NEUROSURGICAL SPINE                Patient Name:  Indu Murphy                                                           Age:   65 y.o.    :  1960     Today's Date:  2023     Date of PAT:   23      Date of Surgery:    2023      Procedure(s):  Left  Total Hip Arthroplasty     Surgeon:   Hakeem Rojo     Primary Care Provider:    Dr. Grace Bright 23                   PLAN:      1)  Cardiac Clearance:  Not requested        2)  Program for Diabetes Health Consult:  Not indicated-A1C 5.7       3)  Sleep Apnea evaluation:   At risk for ARTEMIO. STOP BANG Score 4;  Snoring-denies, Apnea-denies               4) Treatment for MRSA/MSSA:  + MSSA. Tx w/ Mupirocin ointment BID x 5 days to B nostrils starting 23      5) Additional Concerns: Chronic pain, epilepsy, anxiety, depression, former smoker. Mobility impairment, use of assistive devices. Vital Signs:         Visit Vitals  /82 (BP Patient Position: At rest;Sitting)   Pulse 80   Temp 97.6 °F (36.4 °C)   Resp 18   Ht 5' 4.25\" (1.632 m)   Wt 78.6 kg (173 lb 4.5 oz)   SpO2 98%   BMI 29.51 kg/m²                        ____________________________________________  PAST MEDICAL HISTORY  Past Medical History:   Diagnosis Date    Adverse effect of anesthesia     \"heaviness in chest\" after shoulder surgery per pt    Arthritis     spine, hip    At risk for sleep apnea     ARTEMIO score 4.  STOP BANG tool form sent to PCP    Chronic pain     hip    GERD (gastroesophageal reflux disease)     Psychiatric disorder     anxiety and depression    Seizures ([facility] [address]     epilepsy      ____________________________________________  PAST SURGICAL HISTORY  Past Surgical History:   Procedure Laterality Date    HX BREAST REDUCTION      HX CHOLECYSTECTOMY  1984    HX GYN          HX HYSTERECTOMY      HX ORTHOPAEDIC Right 2004    shoulder ____________________________________________  HOME MEDICATIONS    Current Outpatient Medications   Medication Sig    mupirocin (BACTROBAN) 2 % ointment by Both Nostrils route two (2) times a day for 5 days. Lactobac no.41/Bifidobact no.7 (PROBIOTIC-10 PO) Take  by mouth daily. Calcium-Cholecalciferol, D3, (Calcium 600 with Vitamin D3) 600 mg-10 mcg (400 unit) chew Take  by mouth daily. ibuprofen (MOTRIN) 200 mg tablet Take 200 mg by mouth daily as needed for Pain.    hydrOXYzine HCL (ATARAX) 25 mg tablet Take 25 mg by mouth nightly. pantoprazole (PROTONIX) 40 mg tablet Take 40 mg by mouth daily.     Cetirizine (ZyrTEC) 10 mg cap Take  by mouth.    escitalopram oxalate (LEXAPRO) 20 mg tablet TAKE 1 TABLET BY MOUTH EVERY DAY     No current facility-administered medications for this encounter.      ____________________________________________  ALLERGIES  Allergies   Allergen Reactions    Mushroom Itching and Swelling     Throat swells up    Codeine Sulfate Unknown (comments)     Chest tightness    Contrast Dye [Iodine] Shortness of Breath      ____________________________________________  SOCIAL HISTORY  Social History     Tobacco Use    Smoking status: Former     Packs/day: 1.00     Years: 15.00     Pack years: 15.00     Types: Cigarettes     Quit date: 2021     Years since quittin.7    Smokeless tobacco: Former   Substance Use Topics    Alcohol use: Yes     Comment: socially      ____________________________________________  COVID VACCINATION STATUS:      Internal Administration   First Dose      Second Dose         Last COVID Lab No results found for: Barry Smith, RCV2CT, CVD2M, [place], Ranjit Sanchez, [address] [facility], [address] [place] [address], [address], [address], [address] , [address] [place] [address], [address] [address] [place]                   Labs:     Hospital Outpatient Visit on 2023   Component Date Value Ref Range Status    WBC 2023 5.5  3.6 - 11.0 K/uL Final    RBC 2023 4.38  3.80 - 5.20 M/uL Final    HGB 2023 13.9  11.5 - 16.0 g/dL Final    HCT 02/06/2023 42.1  35.0 - 47.0 % Final    MCV 02/06/2023 96.1  80.0 - 99.0 FL Final    MCH 02/06/2023 31.7  26.0 - 34.0 PG Final    MCHC 02/06/2023 33.0  30.0 - 36.5 g/dL Final    RDW 02/06/2023 11.8  11.5 - 14.5 % Final    PLATELET 56/25/1262 734  150 - 400 K/uL Final    MPV 02/06/2023 10.0  8.9 - 12.9 FL Final    NRBC 02/06/2023 0.0  0  WBC Final    ABSOLUTE NRBC 02/06/2023 0.00  0.00 - 0.01 K/uL Final    INR 02/06/2023 1.0  0.9 - 1.1   Final    A single therapeutic range for Vit K antagonists may not be optimal for all indications - see June, 2008 issue of Chest, American College of Chest Physicians Evidence-Based Clinical Practice Guidelines, 8th Edition. Prothrombin time 02/06/2023 10.3  9.0 - 11.1 sec Final    Color 02/06/2023 YELLOW/STRAW    Final    Color Reference Range: Straw, Yellow or Dark Yellow    Appearance 02/06/2023 CLEAR  CLEAR   Final    Specific gravity 02/06/2023 1.016    Final    pH (UA) 02/06/2023 5.5  5.0 - 8.0   Final    Protein 02/06/2023 Negative  NEG mg/dL Final    Glucose 02/06/2023 Negative  NEG mg/dL Final    Ketone 02/06/2023 Negative  NEG mg/dL Final    Bilirubin 02/06/2023 Negative  NEG   Final    Blood 02/06/2023 Negative  NEG   Final    Urobilinogen 02/06/2023 0.2  0.2 - 1.0 EU/dL Final    Nitrites 02/06/2023 Negative  NEG   Final    Leukocyte Esterase 02/06/2023 Negative  NEG   Final    UA:UC IF INDICATED 02/06/2023 CULTURE NOT INDICATED BY UA RESULT  CNI   Final    WBC 02/06/2023 0-4  0 - 4 /hpf Final    RBC 02/06/2023 0-5  0 - 5 /hpf Final    Epithelial cells 02/06/2023 MODERATE (A)  FEW /lpf Final    Epithelial cell category consists of squamous cells and /or transitional urothelial cells. Renal tubular cells, if present, are separately identified as such.     Bacteria 02/06/2023 Negative  NEG /hpf Final    Hyaline cast 02/06/2023 0-2  0 - 2 /lpf Final    Ventricular Rate 02/06/2023 69  BPM Final    Atrial Rate 02/06/2023 69  BPM Final    P-R Interval 02/06/2023 156  ms Final    QRS Duration 02/06/2023 84  ms Final    Q-T Interval 02/06/2023 398  ms Final    QTC Calculation (Bezet) 02/06/2023 426  ms Final    Calculated P Axis 02/06/2023 24  degrees Final    Calculated R Axis 02/06/2023 38  degrees Final    Calculated T Axis 02/06/2023 31  degrees Final    Diagnosis 02/06/2023    Final                    Value:Normal sinus rhythm  Low voltage QRS    Confirmed by Aureliano Izaguirre M.D. ([id number]) on 2/7/2023 9:17:52 AM      Hemoglobin A1c 02/06/2023 5.7 (A)  4.0 - 5.6 % Final    Comment: NEW METHOD  PLEASE NOTE NEW REFERENCE RANGE  (NOTE)  HbA1C Interpretive Ranges  <5.7              Normal  5.7 - 6.4         Consider Prediabetes  >6.5              Consider Diabetes      Est. average glucose 02/06/2023 117  mg/dL Final    Special Requests: 02/06/2023 NO SPECIAL REQUESTS    Final    Culture result: 02/06/2023 MRSA NOT PRESENT. Apparent Staphylococus aureus (not MRSA noted). Final    Culture result: 02/06/2023     Final                    Value:Screening of patient nares for MRSA is for surveillance purposes and, if positive, to facilitate isolation considerations in high risk settings. It is not intended for automatic decolonization interventions per se as regimens are not sufficiently effective to warrant routine use. Sodium 02/06/2023 138  136 - 145 mmol/L Final    Potassium 02/06/2023 3.9  3.5 - 5.1 mmol/L Final    Chloride 02/06/2023 107  97 - 108 mmol/L Final    CO2 02/06/2023 24  21 - 32 mmol/L Final    Anion gap 02/06/2023 7  5 - 15 mmol/L Final    Glucose 02/06/2023 113 (A)  65 - 100 mg/dL Final    BUN 02/06/2023 20  6 - 20 MG/DL Final    Creatinine 02/06/2023 1.11 (A)  0.55 - 1.02 MG/DL Final    BUN/Creatinine ratio 02/06/2023 18  12 - 20   Final    eGFR 02/06/2023 57 (A)  >60 ml/min/1.73m2 Final    Comment:      Pediatric calculator link: Ale[employer].at. org/professionals/kdoqi/gfr_calculatorped       These results are not intended for use in patients <25years of age. eGFR results are calculated without a race factor using  the 2021 CKD-EPI equation. Careful clinical correlation is recommended, particularly when comparing to results calculated using previous equations. The CKD-EPI equation is less accurate in patients with extremes of muscle mass, extra-renal metabolism of creatinine, excessive creatine ingestion, or following therapy that affects renal tubular secretion. Calcium 02/06/2023 9.2  8.5 - 10.1 MG/DL Final    Bilirubin, total 02/06/2023 0.9  0.2 - 1.0 MG/DL Final    ALT (SGPT) 02/06/2023 52  12 - 78 U/L Final    AST (SGOT) 02/06/2023 25  15 - 37 U/L Final    Alk. phosphatase 02/06/2023 123 (A)  45 - 117 U/L Final    Protein, total 02/06/2023 7.5  6.4 - 8.2 g/dL Final    Albumin 02/06/2023 3.9  3.5 - 5.0 g/dL Final    Globulin 02/06/2023 3.6  2.0 - 4.0 g/dL Final    A-G Ratio 02/06/2023 1.1  1.1 - 2.2   Final    Crossmatch Expiration 02/06/2023 02/16/2023,2359   Final    ABO/Rh(D) 02/06/2023 A NEGATIVE   Final    Antibody screen 02/06/2023 NEG   Final        XR Results (most recent):    No results found for this or any previous visit. Skin:   Denies open wounds, cuts, sores, rashes or other areas of concern in PAT assessment.         Faye Forbes NP

## 2023-02-06 NOTE — PROGRESS NOTES
[facility]  Physical Therapy Pre-surgery evaluation  [address] [facility], [address]    PHYSICAL THERAPY PRE THR SURGERY EVALUATION  Patient: Sachi Mendiola (51 y.o. female)  Date: 2/6/2023  Primary Diagnosis: pat  Procedure(s) (LRB):  LEFT TOTAL HIP ARTHROPLASTY, ANTERIOR APPROACH (Left)     Precautions:        ASSESSMENT :  Based on the objective data described below, the patient presents with impaired gait, balance, pain, and overall high level functional mobility due to end stage degenerative joint disease in the left hip. Pt has maintained relative independence with use of a cane but has difficulty tolerating bending to put on shoes/socks, uses slip on boots. She describes pain that worsens by the end of the day. Note: Reports hip pain but then states that it goes down her leg to the top of her foot later in the day. She notes hx of \"lesions in her back\" and Dr. Abbie Olivera note indicates a sacral alar mass. Records are limited, unsure if incidental or if any other follow up has been done. Discussed anticipated disposition to home with possible discharge within a 1 to 2 day time frame post-surgery. Patient and  in agreement. GOALS: (Goals have been discussed and agreed upon with patient.)  DISCHARGE GOALS: Time Frame: 1 DAY  Patient will demonstrate increased strength, range of motion, and pain control via a home exercise program in order to minimize functional deficits in preparation for their upcoming surgery.  This will be achieved by using education, demonstration and through the use of an informational handout including a home exercise program.  REHABILITATION POTENTIAL FOR STATED GOALS: Good     RECOMMENDATIONS AND PLANNED INTERVENTIONS: (Benefits and precautions of physical therapy have been discussed with the patient.)  Home Exercise Program  TREATMENT PLAN EFFECTIVE DATES: 2/6/2023 TO 2/6/2023  FREQUENCY/DURATION: Patient to continue to perform home exercise program at least twice daily until her surgery. SUBJECTIVE:   Patient stated I depends on the time (amt of pain).     OBJECTIVE DATA SUMMARY:   HISTORY:    Past Medical History:   Diagnosis Date    Adverse effect of anesthesia     \"heaviness in chest\" after shoulder surgery per pt    Arthritis     spine, hip    Chronic pain     hip    GERD (gastroesophageal reflux disease)     Psychiatric disorder     Seizures ([facility] [address]      Past Surgical History:   Procedure Laterality Date    HX BREAST REDUCTION      HX CHOLECYSTECTOMY  1984    HX GYN          HX HYSTERECTOMY      HX ORTHOPAEDIC Right 2004    shoulder     Prior Level of Function/Home Situation: mod I with use of cane; difficulty with socks/shoes, uses slip on boots  Personal factors and/or comorbidities impacting plan of care:     Patient []   does  [x]   does not state signs/symptoms of shortness of breath/dyspnea on exertion/respiratory distress. Home Situation  Home Environment: Private residence  # Steps to Enter: 5  Rails to Enter: Yes  Hand Rails : Bilateral (wide)  One/Two Story Residence: Other (Comment) (3 story, uses first and second floor; BR on second)  # of Interior Steps: 12  Interior Rails: Left  Living Alone: No  Support Systems: Spouse/Significant Other, Child(eloina), Other Family Member(s), Friend/Neighbor  Patient Expects to be Discharged to[de-identified] Home with family assistance  Current DME Used/Available at Home: Cane, quad, [place], straight, Raised toilet seat, Tub transfer bench (long shoe horn and reacher)  Tub or Shower Type: Shower      EXAMINATION/PRESENTATION/DECISION MAKING:     ADLs (Current Functional Status):    Bathing/Showering:   [x] Independent  [] Requires Assistance from Someone  [] Sponge Bath Only   Ambulation:  [x] Independent  [] Walk Indoors Only  [] Walk Outdoors  [x] Use Assistive Device  [] Use Wheelchair Only     Dressing:  [x] Independent    Requires Assistance from Someone for:  [] Sock/Shoes  [] Pants  [] Everything   Household Activities:  [] Routine house and yard work  [x] Light Housework Only  [] None       Critical Behavior:  Neurologic State: Alert  Orientation Level: Oriented X4  Cognition: Follows commands       Strength:    Strength: Generally decreased, functional                    Tone & Sensation:   Tone: Normal              Sensation: Intact               Range Of Motion:  AROM: Within functional limits                       Coordination:  Coordination: Within functional limits    Functional Mobility:  Transfers:  Sit to Stand: Modified independent, Additional time  Stand to Sit: Modified independent, Additional time                       Balance:   Sitting: Intact  Standing: Impaired  Standing - Static: Good, Constant support, Other (comment) (cane)  Standing - Dynamic : Fair, Constant support, Other (comment) (cane)  Ambulation/Gait Training:  Distance (ft): 40 Feet (ft)  Assistive Device: Cane, straight  Ambulation - Level of Assistance: Modified independent        Gait Abnormalities: Antalgic, Decreased step clearance        Base of Support: Shift to right  Stance: Left decreased  Speed/Talisha: Slow, Pace decreased (<100 feet/min)  Step Length: Right shortened, Left shortened          Therapeutic Exercises:   The patient was educated in, has demonstrated, and has received written instructions to complete for their home exercise program per total hip replacement protocol. Functional Measure:  Timed up and go:    Timed Get Up And Go Test: 18.78       < than 10 seconds=Normal  Greater then 13.5 seconds (in elderly)=Increased fall risk   Nicole Santoyo [place] M. Predicting the probability for falls in community dwelling older adults using the Timed Up and Go Test. Phys Ther. 2000;80:896-903.           Pain:  Pain Scale 1: Numeric (0 - 10)  Pain Intensity 1: 5   L hip             Activity Tolerance:   Good for session  Patient []   does  [x]   does not demonstrate signs/symptoms of shortness of breath/dyspnea on exertion/respiratory distress. COMMUNICATION/EDUCATION:   The patient was educated on:  [x]         Early post operative mobility is imperative to achieve a patient's desired outcomes and to restore biological function. [x]         Post operative hip precautions may/may not be applicable. These precautions are based on the patient's physician and the procedure(s) performed. [x]         Anterior hip precautions including:        No hip extension        No external rotation        No crossing of the legs/midline    []         Posterior hip precautions including:        No hip flexion >90 degrees        No internal rotation        No crossing of the legs/midline    The patients plan of care was discussed as follows:   [x]         The patient verbalized understanding of her plan in preparation for their upcoming surgery  [x]         The patient's  was present for this session  []        The patient reports that he/she does not have a  identified at this time  [x]         The  verbalized understanding of the education regarding the patient's upcoming surgery  [x]         Patient/family agree to work toward stated goals and plan of care. []         Patient understands intent and goals of therapy, but is neutral about his/her participation. []         Patient is unable to participate in goal setting and plan of care.       Thank you for this referral.  Dariana Hinson, PT   Time Calculation: 20 mins

## 2023-02-06 NOTE — PERIOP NOTES
Hibiclens/Chlorhexidine    Preventing Infections Before and After - Your Surgery    IMPORTANT INSTRUCTIONS    Please read and follow these instructions carefully. If you are unable to comply with the below instructions your procedure will be cancelled. Every Night for Three (3) nights before your surgery:  Shower with an antibacterial soap, such as Dial, or the soap provided at your preassessment appointment. A shower is better than a bath for cleaning your skin. If needed, ask someone to help you reach all areas of your body. Dont forget to clean your belly button with every shower. The night before your surgery: If you lose your Hibiclens/chlorhexidine please contact surgery center or you can purchase it at a local pharmacy  On the night before your surgery, shower with an antibacterial soap, such as Dial, or the soap provided at your preassessment appointment. With one packet of Hibiclens/Chlorhexidine in hand, turn water off. Apply Hibiclens antiseptic skin cleanser with a clean, freshly washed washcloth. Gently apply to your body from chin to toes (except the genital area) and especially the area(s) where your incision(s) will be. Leave Hibiclens/Chlorhexidine on your skin for at least 20 seconds. CAUTION: If needed, Hibiclens/chlorhexidine may be used to clean the folds of skin of the legs (such as in the area of the groin) and on your buttocks and hips. However, do not use Hibiclens/Chlorhexidine above the neck or in the genital area (your bottom) or put inside any area of your body. Turn the water back on and rinse. Dry gently with a clean, freshly washed towel. After your shower, do not use any powder, deodorant, perfumes or lotion. Use clean, freshly washed towels and washcloths every time you shower. Wear clean, freshly washed pajamas to bed the night before surgery. Sleep on clean, freshly washed sheets. Do not allow pets to sleep in your bed with you.         The Morning of your surgery:  Shower again thoroughly with an antibacterial soap, such as Dial or the soap provided at your preassessment appointment. If needed, ask someone for help to reach all areas of your body. Dont forget to clean your belly button! Rinse. Dry gently with a clean, freshly washed towel. After your shower, do not use any powder, deodorant, perfumes or lotion prior to surgery. Put on clean, freshly washed clothing. Tips to help prevent infections after your surgery:  Protect your surgical wound from germs:  Hand washing is the most important thing you and your caregivers can do to prevent infections. Keep your bandage clean and dry! Do not touch your surgical wound. Use clean, freshly washed towels and washcloths every time you shower; do not share bath linens with others. Until your surgical wound is healed, wear clothing and sleep on bed linens each day that are clean and freshly washed. Do not allow pets to sleep in your bed with you or touch your surgical wound. Do not smoke - smoking delays wound healing. This may be a good time to stop smoking. If you have diabetes, it is important for you to manage your blood sugar levels properly before your surgery as well as after your surgery. Poorly managed blood sugar levels slow down wound healing and prevent you from healing completely. If you lose your Hibiclens/chlorhexidine, please call the [facility], or it is available for purchase at your pharmacy.                ___________________      ___________________      ________________  (Signature of Patient)          (Witness)                   (Date and Time)

## 2023-02-06 NOTE — PERIOP NOTES

## 2023-02-06 NOTE — PERIOP NOTES
The [address] \"Your Path to a More Active Life\" orthopedic total knee or total hip educational video and the [facility] patient handbook provided & reviewed during the patients pre-admission testing (PAT) appointment. An opportunity for questions was provided, patient verbalized understanding. ARTEMIO score 4. STOP BANG tool form sent to PCP. Waiting for fax confirmation.  Fax confirmed

## 2023-02-06 NOTE — PROGRESS NOTES
Patient attended the Joint Replacement Education Class at [facility]. The content of the class was presented using a power point presentation specific for patients undergoing hip and knee replacement surgery. The [facility] Joint Replacement Education Handbook was given to the patient. Preparing for surgery, day of surgery routine and expectations, discharge process and help at home expectations, nutrition,medications, infection control, pain management, DVT prevention, ice therapy and safety were reviewed in class. Opportunity for questions provided, patient verbalized understanding of instructions.

## 2023-02-07 LAB
ATRIAL RATE: 69 BPM
BACTERIA SPEC CULT: NORMAL
BACTERIA SPEC CULT: NORMAL
CALCULATED P AXIS, ECG09: 24 DEGREES
CALCULATED R AXIS, ECG10: 38 DEGREES
CALCULATED T AXIS, ECG11: 31 DEGREES
DIAGNOSIS, 93000: NORMAL
P-R INTERVAL, ECG05: 156 MS
Q-T INTERVAL, ECG07: 398 MS
QRS DURATION, ECG06: 84 MS
QTC CALCULATION (BEZET), ECG08: 426 MS
SERVICE CMNT-IMP: NORMAL
VENTRICULAR RATE, ECG03: 69 BPM

## 2023-02-07 RX ORDER — MUPIROCIN 20 MG/G
OINTMENT TOPICAL 2 TIMES DAILY
Qty: 22 G | Refills: 0 | Status: SHIPPED | OUTPATIENT
Start: 2023-02-07 | End: 2023-02-12

## 2023-02-07 NOTE — PERIOP NOTES
Called and instructed patient to pick  up and start Mupirocin ointment prescription today (+MSSA). She verbalized understanding.

## 2023-02-09 ENCOUNTER — DOCUMENTATION ONLY (OUTPATIENT)
Dept: ORTHOPEDIC SURGERY | Age: 63
End: 2023-02-09

## 2023-02-10 ENCOUNTER — ANESTHESIA EVENT (OUTPATIENT)
Dept: SURGERY | Age: 63
End: 2023-02-10
Payer: COMMERCIAL

## 2023-02-12 NOTE — H&P
2023    Chief Complaint: Left hip pain    HPI: This is a 65 y.o. patient who complains of left hip pain which is activity dependent. The patient has failed nonoperative management of this end stage arthritis of the left hip and would like to proceed with a total hip arthroplasty. Patient has been medically and specialty cleared. The patient denies any numbness, weakness, tingling, fevers, chills, nausea, vomiting, fevers, chills, nausea, vomiting. Past Medical History:   Diagnosis Date    Adverse effect of anesthesia     \"heaviness in chest\" after shoulder surgery per pt    Arthritis     spine, hip    At risk for sleep apnea     ARTEMIO score 4. STOP BANG tool form sent to PCP    Chronic pain     hip    GERD (gastroesophageal reflux disease)     Psychiatric disorder     anxiety and depression    Seizures ([facility] [address]     epilepsy       Past Surgical History:   Procedure Laterality Date    HX BREAST REDUCTION      HX CHOLECYSTECTOMY  1984    HX GYN          HX HYSTERECTOMY      HX ORTHOPAEDIC Right 2004    shoulder       No current facility-administered medications on file prior to encounter. Current Outpatient Medications on File Prior to Encounter   Medication Sig Dispense Refill    hydrOXYzine HCL (ATARAX) 25 mg tablet Take 25 mg by mouth nightly. pantoprazole (PROTONIX) 40 mg tablet Take 40 mg by mouth daily.       Cetirizine (ZyrTEC) 10 mg cap Take  by mouth.      escitalopram oxalate (LEXAPRO) 20 mg tablet TAKE 1 TABLET BY MOUTH EVERY DAY         Allergies   Allergen Reactions    Mushroom Itching and Swelling     Throat swells up    Codeine Sulfate Unknown (comments)     Chest tightness    Contrast Dye [Iodine] Shortness of Breath       Family History   Problem Relation Age of Onset    Colon Cancer Mother     Breast Cancer Mother     Colon Cancer Father     Cancer Brother        Social History     Socioeconomic History    Marital status:    Tobacco Use    Smoking status: Former Packs/day: 1.00     Years: 15.00     Pack years: 15.00     Types: Cigarettes     Quit date: 2021     Years since quittin.7    Smokeless tobacco: Former   Vaping Use    Vaping Use: Former    Substances: Nicotine   Substance and Sexual Activity    Alcohol use: Yes     Comment: socially    Drug use: Never         Review of Systems:   Unless noted in the HPI, all 12 systems have been reviewed and are negative for pertinent positives. Physical Examination:      AOX3  No apparent distress  Lungs: Clear to auscultation bilaterally  Heart: regular rate and rhythm  Abdomen: soft, no guarding  Head: NC/AT  Sensation intact to light touch: L1-S1 dermatomes    Extremities      Left upper extremity: full active and passive range of motion without pain, deformity, open wound, strength 5/5 in all major muscle groups  Right upper extremity:full active and passive range of motion without pain, deformity, open wound, strength 5/5 in all major muscle groups  Right lower extremity:full active and passive range of motion without pain, deformity, open wound, strength 5/5 in all major muscle groups  Left lower extremity: No deformity is noted. Circumduction of the hip causes significant pain in the groin, reproductive of the chief complaint. Range of motion is limited, with a positive impingement sign. Gait is antalgic. Sensation is intact to light touch in the L1-S1 dermatomes. Skin is intact about the hip. Hip flexion and abduction strength is 4+/5, hip extension and knee extension as well as tibialis anterior and EHL/GCS are 5/5 strength.     Pertinent Xrays:  Pelvis and hip xrays indicate endstage arthrosis of the left hip      Assessment: Left hip OA    Plan:  Admit to my service  Plan for left total hip arthroplasty  NPO   Preoperative assessments complete    We did discuss the risks of surgery which include but are not limited to infection, nerve or blood vessel damage, failure of fixation, failure of any possible implant, need for reoperation, postoperative pain and swelling, intra-or postoperative fracture, postoperative dislocation, leg length inequality, need for reoperation, implant failure, death, disability, organ dysfunction, wound healing issues, DVT, PE, and the need for further procedures. The patient did freely state their understanding and satisfaction with our discussion. We will proceed after medical clearances. The patient was counseled at length about the risks of abdoul Covid-19 during their perioperative period and any recovery window from their procedure. The patient was made aware that abdoul Covid-19  may worsen their prognosis for recovering from their procedure and lend to a higher morbidity and/or mortality risk. All material risks, benefits, and reasonable alternatives including postponing the procedure were discussed. The patient does  wish to proceed with the procedure at this time.

## 2023-02-13 ENCOUNTER — APPOINTMENT (OUTPATIENT)
Dept: GENERAL RADIOLOGY | Age: 63
End: 2023-02-13
Attending: ORTHOPAEDIC SURGERY
Payer: COMMERCIAL

## 2023-02-13 ENCOUNTER — HOME HEALTH ADMISSION (OUTPATIENT)
Dept: HOME HEALTH SERVICES | Facility: HOME HEALTH | Age: 63
End: 2023-02-13
Payer: COMMERCIAL

## 2023-02-13 ENCOUNTER — ANESTHESIA (OUTPATIENT)
Dept: SURGERY | Age: 63
End: 2023-02-13
Payer: COMMERCIAL

## 2023-02-13 ENCOUNTER — HOSPITAL ENCOUNTER (OUTPATIENT)
Age: 63
Discharge: HOME | End: 2023-02-14
Attending: ORTHOPAEDIC SURGERY | Admitting: ORTHOPAEDIC SURGERY
Payer: COMMERCIAL

## 2023-02-13 DIAGNOSIS — Z96.641 STATUS POST TOTAL REPLACEMENT OF RIGHT HIP: Primary | ICD-10-CM

## 2023-02-13 PROBLEM — M16.12 UNILATERAL PRIMARY OSTEOARTHRITIS, LEFT HIP: Status: ACTIVE | Noted: 2023-02-13

## 2023-02-13 PROCEDURE — 74011250637 HC RX REV CODE- 250/637: Performed by: ORTHOPAEDIC SURGERY

## 2023-02-13 PROCEDURE — 97161 PT EVAL LOW COMPLEX 20 MIN: CPT

## 2023-02-13 PROCEDURE — 2709999900 HC NON-CHARGEABLE SUPPLY

## 2023-02-13 PROCEDURE — 74011250636 HC RX REV CODE- 250/636: Performed by: ORTHOPAEDIC SURGERY

## 2023-02-13 PROCEDURE — 74011000250 HC RX REV CODE- 250: Performed by: ORTHOPAEDIC SURGERY

## 2023-02-13 PROCEDURE — 77030038692 HC WND DEB SYS IRMX -B: Performed by: ORTHOPAEDIC SURGERY

## 2023-02-13 PROCEDURE — 74011250636 HC RX REV CODE- 250/636: Performed by: NURSE ANESTHETIST, CERTIFIED REGISTERED

## 2023-02-13 PROCEDURE — 72170 X-RAY EXAM OF PELVIS: CPT

## 2023-02-13 PROCEDURE — 77030002933 HC SUT MCRYL J&J -A: Performed by: ORTHOPAEDIC SURGERY

## 2023-02-13 PROCEDURE — 76010000161 HC OR TIME 1 TO 1.5 HR INTENSV-TIER 1: Performed by: ORTHOPAEDIC SURGERY

## 2023-02-13 PROCEDURE — 77010033678 HC OXYGEN DAILY

## 2023-02-13 PROCEDURE — 76000 FLUOROSCOPY <1 HR PHYS/QHP: CPT

## 2023-02-13 PROCEDURE — 77030039825 HC MSK NSL PAP SUPERNO2VA VYRM -B: Performed by: ANESTHESIOLOGY

## 2023-02-13 PROCEDURE — 74011000250 HC RX REV CODE- 250: Performed by: ANESTHESIOLOGY

## 2023-02-13 PROCEDURE — 97116 GAIT TRAINING THERAPY: CPT

## 2023-02-13 PROCEDURE — 77030011264 HC ELECTRD BLD EXT COVD -A: Performed by: ORTHOPAEDIC SURGERY

## 2023-02-13 PROCEDURE — 76060000033 HC ANESTHESIA 1 TO 1.5 HR: Performed by: ORTHOPAEDIC SURGERY

## 2023-02-13 PROCEDURE — 94760 N-INVAS EAR/PLS OXIMETRY 1: CPT

## 2023-02-13 PROCEDURE — 76210000016 HC OR PH I REC 1 TO 1.5 HR: Performed by: ORTHOPAEDIC SURGERY

## 2023-02-13 PROCEDURE — 74011250636 HC RX REV CODE- 250/636: Performed by: ANESTHESIOLOGY

## 2023-02-13 PROCEDURE — 2709999900 HC NON-CHARGEABLE SUPPLY: Performed by: ORTHOPAEDIC SURGERY

## 2023-02-13 PROCEDURE — 77030010507 HC ADH SKN DERMBND J&J -B: Performed by: ORTHOPAEDIC SURGERY

## 2023-02-13 PROCEDURE — 74011250637 HC RX REV CODE- 250/637: Performed by: NURSE PRACTITIONER

## 2023-02-13 PROCEDURE — 27130 TOTAL HIP ARTHROPLASTY: CPT | Performed by: ORTHOPAEDIC SURGERY

## 2023-02-13 PROCEDURE — C1776 JOINT DEVICE (IMPLANTABLE): HCPCS | Performed by: ORTHOPAEDIC SURGERY

## 2023-02-13 PROCEDURE — 74011000250 HC RX REV CODE- 250: Performed by: NURSE ANESTHETIST, CERTIFIED REGISTERED

## 2023-02-13 PROCEDURE — 77030031139 HC SUT VCRL2 J&J -A: Performed by: ORTHOPAEDIC SURGERY

## 2023-02-13 PROCEDURE — 73501 X-RAY EXAM HIP UNI 1 VIEW: CPT

## 2023-02-13 PROCEDURE — 77030006835 HC BLD SAW SAG STRY -B: Performed by: ORTHOPAEDIC SURGERY

## 2023-02-13 PROCEDURE — 74011000258 HC RX REV CODE- 258: Performed by: ORTHOPAEDIC SURGERY

## 2023-02-13 PROCEDURE — 77030018673: Performed by: ORTHOPAEDIC SURGERY

## 2023-02-13 DEVICE — SCREW BNE L15MM DIA6.5MM LO PROF HEX TRIDENT LL: Type: IMPLANTABLE DEVICE | Site: HIP | Status: FUNCTIONAL

## 2023-02-13 DEVICE — COMPONENT TOT HIP CAPPED LNR POLYETH H2STRYKER] STRYKER CORP]: Type: IMPLANTABLE DEVICE | Status: FUNCTIONAL

## 2023-02-13 DEVICE — TRIDENT X3 0 DEGREE POLYETHYLENE INSERT
Type: IMPLANTABLE DEVICE | Site: HIP | Status: FUNCTIONAL
Brand: TRIDENT X3 INSERT

## 2023-02-13 DEVICE — SHELL ACET CLUS HOLE D 50 MM HIP HA TRIDENT II: Type: IMPLANTABLE DEVICE | Site: HIP | Status: FUNCTIONAL

## 2023-02-13 DEVICE — CERAMIC V40 FEMORAL HEAD
Type: IMPLANTABLE DEVICE | Site: HIP | Status: FUNCTIONAL
Brand: BIOLOX

## 2023-02-13 DEVICE — 132 DEGREE NECK ANGLE HIP STEM
Type: IMPLANTABLE DEVICE | Site: HIP | Status: FUNCTIONAL
Brand: ACCOLADE

## 2023-02-13 DEVICE — SCREW BNE L30MM DIA6.5MM STD CANC HIP TI HMSPHR THRD DRVR: Type: IMPLANTABLE DEVICE | Site: HIP | Status: FUNCTIONAL

## 2023-02-13 RX ORDER — HYDROMORPHONE HYDROCHLORIDE 1 MG/ML
0.2 INJECTION, SOLUTION INTRAMUSCULAR; INTRAVENOUS; SUBCUTANEOUS
Status: DISCONTINUED | OUTPATIENT
Start: 2023-02-13 | End: 2023-02-13 | Stop reason: HOSPADM

## 2023-02-13 RX ORDER — PHENYLEPHRINE HCL IN 0.9% NACL 0.4MG/10ML
SYRINGE (ML) INTRAVENOUS AS NEEDED
Status: DISCONTINUED | OUTPATIENT
Start: 2023-02-13 | End: 2023-02-13 | Stop reason: HOSPADM

## 2023-02-13 RX ORDER — HYDROXYZINE 25 MG/1
25 TABLET, FILM COATED ORAL
Status: DISCONTINUED | OUTPATIENT
Start: 2023-02-13 | End: 2023-02-14 | Stop reason: HOSPADM

## 2023-02-13 RX ORDER — EPHEDRINE SULFATE/0.9% NACL/PF 50 MG/5 ML
SYRINGE (ML) INTRAVENOUS AS NEEDED
Status: DISCONTINUED | OUTPATIENT
Start: 2023-02-13 | End: 2023-02-13 | Stop reason: HOSPADM

## 2023-02-13 RX ORDER — MIDAZOLAM HYDROCHLORIDE 1 MG/ML
INJECTION, SOLUTION INTRAMUSCULAR; INTRAVENOUS AS NEEDED
Status: DISCONTINUED | OUTPATIENT
Start: 2023-02-13 | End: 2023-02-13 | Stop reason: HOSPADM

## 2023-02-13 RX ORDER — SODIUM CHLORIDE 0.9 % (FLUSH) 0.9 %
5-40 SYRINGE (ML) INJECTION AS NEEDED
Status: DISCONTINUED | OUTPATIENT
Start: 2023-02-13 | End: 2023-02-13 | Stop reason: HOSPADM

## 2023-02-13 RX ORDER — POLYETHYLENE GLYCOL 3350 17 G/17G
17 POWDER, FOR SOLUTION ORAL DAILY
Status: DISCONTINUED | OUTPATIENT
Start: 2023-02-13 | End: 2023-02-14 | Stop reason: HOSPADM

## 2023-02-13 RX ORDER — SODIUM CHLORIDE, SODIUM LACTATE, POTASSIUM CHLORIDE, CALCIUM CHLORIDE 600; 310; 30; 20 MG/100ML; MG/100ML; MG/100ML; MG/100ML
INJECTION, SOLUTION INTRAVENOUS
Status: DISCONTINUED | OUTPATIENT
Start: 2023-02-13 | End: 2023-02-13 | Stop reason: HOSPADM

## 2023-02-13 RX ORDER — ONDANSETRON 2 MG/ML
4 INJECTION INTRAMUSCULAR; INTRAVENOUS
Status: ACTIVE | OUTPATIENT
Start: 2023-02-13 | End: 2023-02-14

## 2023-02-13 RX ORDER — DEXAMETHASONE SODIUM PHOSPHATE 4 MG/ML
10 INJECTION, SOLUTION INTRA-ARTICULAR; INTRALESIONAL; INTRAMUSCULAR; INTRAVENOUS; SOFT TISSUE ONCE
Status: COMPLETED | OUTPATIENT
Start: 2023-02-13 | End: 2023-02-13

## 2023-02-13 RX ORDER — BUPIVACAINE HYDROCHLORIDE 5 MG/ML
INJECTION, SOLUTION EPIDURAL; INTRACAUDAL AS NEEDED
Status: DISCONTINUED | OUTPATIENT
Start: 2023-02-13 | End: 2023-02-13 | Stop reason: HOSPADM

## 2023-02-13 RX ORDER — AMOXICILLIN 250 MG
1 CAPSULE ORAL 2 TIMES DAILY
Status: DISCONTINUED | OUTPATIENT
Start: 2023-02-13 | End: 2023-02-14 | Stop reason: HOSPADM

## 2023-02-13 RX ORDER — ESCITALOPRAM OXALATE 10 MG/1
20 TABLET ORAL DAILY
Status: DISCONTINUED | OUTPATIENT
Start: 2023-02-14 | End: 2023-02-14 | Stop reason: HOSPADM

## 2023-02-13 RX ORDER — PROPOFOL 10 MG/ML
INJECTION, EMULSION INTRAVENOUS AS NEEDED
Status: DISCONTINUED | OUTPATIENT
Start: 2023-02-13 | End: 2023-02-13 | Stop reason: HOSPADM

## 2023-02-13 RX ORDER — NALOXONE HYDROCHLORIDE 0.4 MG/ML
0.4 INJECTION, SOLUTION INTRAMUSCULAR; INTRAVENOUS; SUBCUTANEOUS AS NEEDED
Status: DISCONTINUED | OUTPATIENT
Start: 2023-02-13 | End: 2023-02-14 | Stop reason: HOSPADM

## 2023-02-13 RX ORDER — SODIUM CHLORIDE 0.9 % (FLUSH) 0.9 %
5-40 SYRINGE (ML) INJECTION EVERY 8 HOURS
Status: DISCONTINUED | OUTPATIENT
Start: 2023-02-13 | End: 2023-02-14 | Stop reason: HOSPADM

## 2023-02-13 RX ORDER — ACETAMINOPHEN 500 MG
1000 TABLET ORAL ONCE
Status: COMPLETED | OUTPATIENT
Start: 2023-02-13 | End: 2023-02-13

## 2023-02-13 RX ORDER — ASPIRIN 325 MG
325 TABLET, DELAYED RELEASE (ENTERIC COATED) ORAL 2 TIMES DAILY
Status: DISCONTINUED | OUTPATIENT
Start: 2023-02-13 | End: 2023-02-14 | Stop reason: HOSPADM

## 2023-02-13 RX ORDER — VANCOMYCIN HYDROCHLORIDE 1 G/20ML
INJECTION, POWDER, LYOPHILIZED, FOR SOLUTION INTRAVENOUS AS NEEDED
Status: DISCONTINUED | OUTPATIENT
Start: 2023-02-13 | End: 2023-02-13 | Stop reason: HOSPADM

## 2023-02-13 RX ORDER — HYDROMORPHONE HYDROCHLORIDE 1 MG/ML
0.5 INJECTION, SOLUTION INTRAMUSCULAR; INTRAVENOUS; SUBCUTANEOUS
Status: ACTIVE | OUTPATIENT
Start: 2023-02-13 | End: 2023-02-14

## 2023-02-13 RX ORDER — SODIUM CHLORIDE 9 MG/ML
125 INJECTION, SOLUTION INTRAVENOUS CONTINUOUS
Status: DISPENSED | OUTPATIENT
Start: 2023-02-13 | End: 2023-02-14

## 2023-02-13 RX ORDER — TRAMADOL HYDROCHLORIDE 50 MG/1
50 TABLET ORAL
Status: DISCONTINUED | OUTPATIENT
Start: 2023-02-13 | End: 2023-02-13

## 2023-02-13 RX ORDER — ONDANSETRON 2 MG/ML
4 INJECTION INTRAMUSCULAR; INTRAVENOUS AS NEEDED
Status: DISCONTINUED | OUTPATIENT
Start: 2023-02-13 | End: 2023-02-13 | Stop reason: HOSPADM

## 2023-02-13 RX ORDER — ACETAMINOPHEN 325 MG/1
650 TABLET ORAL
Status: DISCONTINUED | OUTPATIENT
Start: 2023-02-13 | End: 2023-02-13 | Stop reason: SDUPTHER

## 2023-02-13 RX ORDER — PROPOFOL 10 MG/ML
INJECTION, EMULSION INTRAVENOUS
Status: DISCONTINUED | OUTPATIENT
Start: 2023-02-13 | End: 2023-02-13 | Stop reason: HOSPADM

## 2023-02-13 RX ORDER — FENTANYL CITRATE 50 UG/ML
25 INJECTION, SOLUTION INTRAMUSCULAR; INTRAVENOUS
Status: DISCONTINUED | OUTPATIENT
Start: 2023-02-13 | End: 2023-02-13 | Stop reason: HOSPADM

## 2023-02-13 RX ORDER — ROPIVACAINE HYDROCHLORIDE 5 MG/ML
INJECTION, SOLUTION EPIDURAL; INFILTRATION; PERINEURAL AS NEEDED
Status: DISCONTINUED | OUTPATIENT
Start: 2023-02-13 | End: 2023-02-13 | Stop reason: HOSPADM

## 2023-02-13 RX ORDER — OXYCODONE HYDROCHLORIDE 5 MG/1
5 TABLET ORAL
Status: DISCONTINUED | OUTPATIENT
Start: 2023-02-13 | End: 2023-02-13

## 2023-02-13 RX ORDER — FAMOTIDINE 20 MG/1
20 TABLET, FILM COATED ORAL 2 TIMES DAILY
Status: DISCONTINUED | OUTPATIENT
Start: 2023-02-13 | End: 2023-02-14 | Stop reason: HOSPADM

## 2023-02-13 RX ORDER — ONDANSETRON 2 MG/ML
INJECTION INTRAMUSCULAR; INTRAVENOUS AS NEEDED
Status: DISCONTINUED | OUTPATIENT
Start: 2023-02-13 | End: 2023-02-13 | Stop reason: HOSPADM

## 2023-02-13 RX ORDER — CETIRIZINE HYDROCHLORIDE 10 MG/1
10 TABLET ORAL DAILY
Status: DISCONTINUED | OUTPATIENT
Start: 2023-02-14 | End: 2023-02-14 | Stop reason: HOSPADM

## 2023-02-13 RX ORDER — SODIUM CHLORIDE 0.9 % (FLUSH) 0.9 %
5-40 SYRINGE (ML) INJECTION EVERY 8 HOURS
Status: DISCONTINUED | OUTPATIENT
Start: 2023-02-13 | End: 2023-02-13 | Stop reason: HOSPADM

## 2023-02-13 RX ORDER — FACIAL-BODY WIPES
10 EACH TOPICAL DAILY PRN
Status: DISCONTINUED | OUTPATIENT
Start: 2023-02-15 | End: 2023-02-14 | Stop reason: HOSPADM

## 2023-02-13 RX ORDER — SODIUM CHLORIDE, SODIUM LACTATE, POTASSIUM CHLORIDE, CALCIUM CHLORIDE 600; 310; 30; 20 MG/100ML; MG/100ML; MG/100ML; MG/100ML
25 INJECTION, SOLUTION INTRAVENOUS CONTINUOUS
Status: DISCONTINUED | OUTPATIENT
Start: 2023-02-13 | End: 2023-02-14 | Stop reason: HOSPADM

## 2023-02-13 RX ORDER — DIPHENHYDRAMINE HYDROCHLORIDE 50 MG/ML
12.5 INJECTION, SOLUTION INTRAMUSCULAR; INTRAVENOUS
Status: ACTIVE | OUTPATIENT
Start: 2023-02-13 | End: 2023-02-14

## 2023-02-13 RX ORDER — SODIUM CHLORIDE 0.9 % (FLUSH) 0.9 %
5-40 SYRINGE (ML) INJECTION AS NEEDED
Status: DISCONTINUED | OUTPATIENT
Start: 2023-02-13 | End: 2023-02-14 | Stop reason: HOSPADM

## 2023-02-13 RX ORDER — FERROUS SULFATE, DRIED 160(50) MG
1 TABLET, EXTENDED RELEASE ORAL DAILY
Status: DISCONTINUED | OUTPATIENT
Start: 2023-02-14 | End: 2023-02-14 | Stop reason: HOSPADM

## 2023-02-13 RX ORDER — OXYCODONE HYDROCHLORIDE 5 MG/1
5-10 TABLET ORAL
Status: DISCONTINUED | OUTPATIENT
Start: 2023-02-13 | End: 2023-02-14 | Stop reason: HOSPADM

## 2023-02-13 RX ORDER — ACETAMINOPHEN 500 MG
1000 TABLET ORAL EVERY 6 HOURS
Status: DISCONTINUED | OUTPATIENT
Start: 2023-02-13 | End: 2023-02-14 | Stop reason: HOSPADM

## 2023-02-13 RX ADMIN — FAMOTIDINE 20 MG: 20 TABLET, FILM COATED ORAL at 11:18

## 2023-02-13 RX ADMIN — ONDANSETRON HYDROCHLORIDE 4 MG: 2 INJECTION, SOLUTION INTRAMUSCULAR; INTRAVENOUS at 08:36

## 2023-02-13 RX ADMIN — OXYCODONE 5 MG: 5 TABLET ORAL at 14:56

## 2023-02-13 RX ADMIN — OXYCODONE 5 MG: 5 TABLET ORAL at 12:52

## 2023-02-13 RX ADMIN — PROPOFOL 20 MG: 10 INJECTION, EMULSION INTRAVENOUS at 07:48

## 2023-02-13 RX ADMIN — TRANEXAMIC ACID 1 G: 100 INJECTION, SOLUTION INTRAVENOUS at 07:38

## 2023-02-13 RX ADMIN — HYDROXYZINE HYDROCHLORIDE 25 MG: 25 TABLET, FILM COATED ORAL at 22:00

## 2023-02-13 RX ADMIN — ACETAMINOPHEN 1000 MG: 500 TABLET ORAL at 06:03

## 2023-02-13 RX ADMIN — SODIUM CHLORIDE, POTASSIUM CHLORIDE, SODIUM LACTATE AND CALCIUM CHLORIDE: 600; 310; 30; 20 INJECTION, SOLUTION INTRAVENOUS at 07:57

## 2023-02-13 RX ADMIN — Medication 40 MCG: at 07:50

## 2023-02-13 RX ADMIN — MIDAZOLAM HYDROCHLORIDE 2 MG: 1 INJECTION, SOLUTION INTRAMUSCULAR; INTRAVENOUS at 07:19

## 2023-02-13 RX ADMIN — PROPOFOL 30 MG: 10 INJECTION, EMULSION INTRAVENOUS at 07:36

## 2023-02-13 RX ADMIN — CEFAZOLIN 2 G: 1 INJECTION, POWDER, FOR SOLUTION INTRAMUSCULAR; INTRAVENOUS at 14:56

## 2023-02-13 RX ADMIN — ACETAMINOPHEN 1000 MG: 500 TABLET ORAL at 11:18

## 2023-02-13 RX ADMIN — Medication 1 AMPULE: at 22:09

## 2023-02-13 RX ADMIN — SODIUM CHLORIDE 125 ML/HR: 9 INJECTION, SOLUTION INTRAVENOUS at 23:57

## 2023-02-13 RX ADMIN — POLYETHYLENE GLYCOL 3350 17 G: 17 POWDER, FOR SOLUTION ORAL at 11:18

## 2023-02-13 RX ADMIN — OXYCODONE 10 MG: 5 TABLET ORAL at 22:21

## 2023-02-13 RX ADMIN — Medication 1 AMPULE: at 11:18

## 2023-02-13 RX ADMIN — PROPOFOL 20 MG: 10 INJECTION, EMULSION INTRAVENOUS at 07:43

## 2023-02-13 RX ADMIN — FAMOTIDINE 20 MG: 20 TABLET, FILM COATED ORAL at 17:03

## 2023-02-13 RX ADMIN — CEFAZOLIN 2 G: 1 INJECTION, POWDER, FOR SOLUTION INTRAMUSCULAR; INTRAVENOUS at 23:01

## 2023-02-13 RX ADMIN — DEXAMETHASONE SODIUM PHOSPHATE 10 MG: 4 INJECTION, SOLUTION INTRAMUSCULAR; INTRAVENOUS at 07:39

## 2023-02-13 RX ADMIN — ACETAMINOPHEN 1000 MG: 500 TABLET ORAL at 23:01

## 2023-02-13 RX ADMIN — DOCUSATE SODIUM - SENNOSIDES 1 TABLET: 50; 8.6 TABLET, FILM COATED ORAL at 11:18

## 2023-02-13 RX ADMIN — BUPIVACAINE HYDROCHLORIDE 10 MG: 5 INJECTION, SOLUTION EPIDURAL; INTRACAUDAL; PERINEURAL at 07:21

## 2023-02-13 RX ADMIN — SODIUM CHLORIDE, PRESERVATIVE FREE 10 ML: 5 INJECTION INTRAVENOUS at 22:23

## 2023-02-13 RX ADMIN — SODIUM CHLORIDE, POTASSIUM CHLORIDE, SODIUM LACTATE AND CALCIUM CHLORIDE: 600; 310; 30; 20 INJECTION, SOLUTION INTRAVENOUS at 06:45

## 2023-02-13 RX ADMIN — PROPOFOL 50 MCG/KG/MIN: 10 INJECTION, EMULSION INTRAVENOUS at 07:36

## 2023-02-13 RX ADMIN — Medication 40 MCG: at 08:06

## 2023-02-13 RX ADMIN — Medication 3 AMPULE: at 06:03

## 2023-02-13 RX ADMIN — Medication 5 MG: at 07:40

## 2023-02-13 RX ADMIN — OXYCODONE 10 MG: 5 TABLET ORAL at 18:12

## 2023-02-13 RX ADMIN — WATER 2 G: 1 INJECTION INTRAMUSCULAR; INTRAVENOUS; SUBCUTANEOUS at 07:31

## 2023-02-13 RX ADMIN — SODIUM CHLORIDE 125 ML/HR: 9 INJECTION, SOLUTION INTRAVENOUS at 09:49

## 2023-02-13 RX ADMIN — SODIUM CHLORIDE, PRESERVATIVE FREE 10 ML: 5 INJECTION INTRAVENOUS at 15:00

## 2023-02-13 RX ADMIN — Medication 80 MCG: at 08:01

## 2023-02-13 RX ADMIN — ASPIRIN 325 MG: 325 TABLET, COATED ORAL at 17:04

## 2023-02-13 RX ADMIN — ACETAMINOPHEN 1000 MG: 500 TABLET ORAL at 17:04

## 2023-02-13 RX ADMIN — DOCUSATE SODIUM - SENNOSIDES 1 TABLET: 50; 8.6 TABLET, FILM COATED ORAL at 17:03

## 2023-02-13 NOTE — PROGRESS NOTES
End of Shift Note    Bedside shift change report given to [address] [place] [address] (oncoming nurse) by Majo Ambriz RN (offgoing nurse). Report included the following information SBAR, Kardex, Intake/Output, MAR, and Recent Results    Shift worked:  Day     Shift summary and any significant changes:     X1 assist, voiding, oxy prn, vitals stable, RA     Concerns for physician to address:       Zone phone for oncoming shift:   [phone]       Activity:  Activity Level: Logroll  Number times ambulated in hallways past shift: 0  Number of times OOB to chair past shift: 2    Cardiac:   Cardiac Monitoring: No      Cardiac Rhythm: Sinus Rhythm    Access:  Current line(s): PIV     Genitourinary:   Urinary status: voiding    Respiratory:   O2 Device: Nasal cannula  Chronic home O2 use?: NO  Incentive spirometer at bedside: YES       GI:  Last Bowel Movement Date: 02/13/23  Current diet:  ADULT DIET Regular  Passing flatus: YES  Tolerating current diet: YES       Pain Management:   Patient states pain is manageable on current regimen: YES    Skin:  Chandan Score: 20  Interventions: increase time out of bed, PT/OT consult, limit briefs, internal/external urinary devices, and nutritional support     Patient Safety:  Fall Score:  Total Score: 3  Interventions: bed/chair alarm, assistive device (walker, cane, etc), gripper socks, pt to call before getting OOB, and stay with me (per policy)  High Fall Risk: Yes    Length of Stay:  Expected LOS: - - -  Actual LOS: 0      Majo Ambriz RN

## 2023-02-13 NOTE — PROGRESS NOTES
Problem: Mobility Impaired (Adult and Pediatric)  Goal: *Acute Goals and Plan of Care (Insert Text)  Description: FUNCTIONAL STATUS PRIOR TO ADMISSION: Patient was modified independent using a single point cane for functional mobility. HOME SUPPORT PRIOR TO ADMISSION: The patient lived with . Physical Therapy Goals  Initiated 2/13/2023  1. Patient will move from supine to sit and sit to supine  in bed with independence within 7 day(s). 2.  Patient will transfer from bed to chair and chair to bed with modified independence using the least restrictive device within 7 day(s). 3.  Patient will perform sit to stand with modified independence within 7 day(s). 4.  Patient will ambulate with modified independence for 150 feet with the least restrictive device within 7 day(s). 5.  Patient will ascend/descend 5 stairs with 1 handrail(s) with modified independence within 7 day(s). Outcome: Not Met     PHYSICAL THERAPY EVALUATION  Patient: Osmany Lima (26 y.o. female)  Date: 2/13/2023  Primary Diagnosis: Unilateral primary osteoarthritis, left hip [M16.12]  Procedure(s) (LRB):  LEFT TOTAL HIP ARTHROPLASTY, ANTERIOR APPROACH (Left) Day of Surgery   Precautions:   WBAT, Fall      ASSESSMENT  Based on the objective data described below, the patient presents with generalized weakness, decreased activity tolerance, impaired balance, altered gait, and increased pain. Pt was received in supine and cleared by nursing to mobilize. She required additional time to come to the EOB. Stood with RW and ambulated around the room with RW and step to gait pattern. She reports pain is terrible 10/10 and that she can't go home today. She was returned to supine at the end of the session. Ice and compression applied. She will likely do very well and could potentially clear in AM. Vitals stable during session. Current Level of Function Impacting Discharge (mobility/balance): CGA    Functional Outcome Measure:   The patient scored Total: 50/100 on the Barthel Index outcome measure which is indicative of being partially dependent in basic self-care. Other factors to consider for discharge:      Patient will benefit from skilled therapy intervention to address the above noted impairments. PLAN :  Recommendations and Planned Interventions: bed mobility training, transfer training, gait training, therapeutic exercises, patient and family training/education, and therapeutic activities      Frequency/Duration: Patient will be followed by physical therapy:  twice daily to address goals. Recommendation for discharge: (in order for the patient to meet his/her long term goals)  Physical therapy at least 2 days/week in the home     This discharge recommendation:  Has been made in collaboration with the attending provider and/or case management    IF patient discharges home will need the following DME: rolling walker         SUBJECTIVE:   Patient stated I can't go home today.     OBJECTIVE DATA SUMMARY:   HISTORY:    Past Medical History:   Diagnosis Date    Adverse effect of anesthesia     \"heaviness in chest\" after shoulder surgery per pt    Arthritis     spine, hip    At risk for sleep apnea     ARTEMIO score 4.  STOP BANG tool form sent to PCP    Chronic pain     hip    GERD (gastroesophageal reflux disease)     Psychiatric disorder     anxiety and depression    Seizures ([facility] [address]     epilepsy     Past Surgical History:   Procedure Laterality Date    HX BREAST REDUCTION      HX CHOLECYSTECTOMY      HX GYN          HX HYSTERECTOMY      HX ORTHOPAEDIC Right 2004    shoulder       Personal factors and/or comorbidities impacting plan of care:     Home Situation  Home Environment: Private residence  # Steps to Enter: 5  Rails to Enter: Yes  Hand Rails : Bilateral  Support Systems: Spouse/Significant Other  Patient Expects to be Discharged to[de-identified] Home with home health  Current DME Used/Available at Home: [place], straight, Raised toilet seat  Tub or Shower Type: Shower    EXAMINATION/PRESENTATION/DECISION MAKING:   Critical Behavior:  Neurologic State: Alert  Orientation Level: Oriented X4  Cognition: Follows commands     Hearing: Auditory  Auditory Impairment: None  Skin:  dressing intact  Edema: WDL  Range Of Motion:  AROM: Generally decreased, functional           PROM: Generally decreased, functional           Strength:    Strength: Generally decreased, functional                    Tone & Sensation:   Tone: Normal              Sensation: Intact               Coordination:  Coordination: Within functional limits  Vision:      Functional Mobility:  Bed Mobility:  Rolling: Stand-by assistance  Supine to Sit: Stand-by assistance  Sit to Supine: Stand-by assistance  Scooting: Stand-by assistance  Transfers:  Sit to Stand: Stand-by assistance  Stand to Sit: Stand-by assistance                       Balance:   Sitting: Intact  Standing: Impaired  Standing - Static: Fair;Constant support  Standing - Dynamic : Fair;Constant support  Ambulation/Gait Training:  Distance (ft): 30 Feet (ft)  Assistive Device: Gait belt;Walker, rolling  Ambulation - Level of Assistance: Contact guard assistance        Gait Abnormalities: Antalgic;Decreased step clearance; Step to gait        Base of Support: Shift to right     Speed/Talisha: Pace decreased (<100 feet/min)  Step Length: Left shortened;Right shortened                      Therapeutic Exercises: Ankle pumps    Functional Measure:  Barthel Index:    Bathin  Bladder: 10  Bowels: 10  Groomin  Dressin  Feeding: 10  Mobility: 0  Stairs: 0  Toilet Use: 5  Transfer (Bed to Chair and Back): 10  Total: 50/100       The Barthel ADL Index: Guidelines  1. The index should be used as a record of what a patient does, not as a record of what a patient could do.   2. The main aim is to establish degree of independence from any help, physical or verbal, however minor and for whatever reason. 3. The need for supervision renders the patient not independent. 4. A patient's performance should be established using the best available evidence. Asking the patient, friends/relatives and nurses are the usual sources, but direct observation and common sense are also important. However direct testing is not needed. 5. Usually the patient's performance over the preceding 24-48 hours is important, but occasionally longer periods will be relevant. 6. Middle categories imply that the patient supplies over 50 per cent of the effort. 7. Use of aids to be independent is allowed. Score Interpretation (from [facility] [address])    Independent   60-79 Minimally independent   40-59 Partially dependent   20-39 Very dependent   <20 Totally dependent     -Cintia Mayfield., Barthel, DMICAELA. (1965). Functional evaluation: the Barthel Index. [facility] ([address] (1997). The Barthel activities of daily living index: self-reporting versus actual performance in the old (> or = 75 years). Journal of [address] [place] [address] 45(7), [address] [facility] [place], J.J.MLorena, Rodrigue Hernándezimer., [facility]. (1999). Measuring the change in disability after inpatient rehabilitation; comparison of the responsiveness of the Barthel Index and Functional Tina Measure. Journal of Neurology, Neurosurgery, and Psychiatry, 66(4), 262-389. Lorenzo Mendez, NLorenaJ.A, CIRILO Fields, & Leola Downing, M.A. (2004) Assessment of post-stroke quality of life in cost-effectiveness studies: The usefulness of the Barthel Index and the EuroQoL-5D.  Quality of Life Research, 15, 360-03        Physical Therapy Evaluation Charge Determination   History Examination Presentation Decision-Making   HIGH Complexity :3+ comorbidities / personal factors will impact the outcome/ POC  MEDIUM Complexity : 3 Standardized tests and measures addressing body structure, function, activity limitation and / or participation in recreation  MEDIUM Complexity : Evolving with changing characteristics  Other outcome measures barthel  MEDIUM      Based on the above components, the patient evaluation is determined to be of the following complexity level: MEDIUM    Pain Rating:  10/10    Activity Tolerance:   Fair    After treatment patient left in no apparent distress:   Supine in bed and Call bell within reach    COMMUNICATION/EDUCATION:   The patients plan of care was discussed with: Registered nurse. Fall prevention education was provided and the patient/caregiver indicated understanding. and Patient/family agree to work toward stated goals and plan of care.     Thank you for this referral.  Haven Cheung PT, DPT   Time Calculation: 27 mins

## 2023-02-13 NOTE — OP NOTES
Date of Procedure: 2/13/2023  PREOPERATIVE DIAGNOSIS: Left hip osteoarthritis. POSTOPERATIVE DIAGNOSIS: same  OPERATION: Left total hip arthroplasty. ASSISTANT SURGEON: none  ANESTHESIA: spinal.  ESTIMATED BLOOD LOSS: 50 mL. COMPLICATIONS: None. SPECIMEN: None. DRAINS: None. IMPLANTS:     [place] Trident hemispherical acetabular shell 50 mm outer diameter   [place] trident X3 0 degree polyethylene insert 36 mm inner diameter   Biolox Delta ceramic v40 femoral head 36 mm outer diameter  +2.5 Neck length   Accolade 2, 132 degree neck angle, hip stem size 3 with a V40 taper. Two 6.5 mm bone screws were added, 15 mm and 30 mm. OPERATIVE INDICATIONS: This is a(an) 65 y.o. female who failed  nonoperative management of left hip osteoarthritis. We did discuss the risks  of surgery which include but are not limited to infection, nerve or blood  vessel damage, need for reoperation, disability, DVT, PE, postoperative pain,  swelling, stiffness, intra or postoperative fracture, leg length inequality    and postoperative dislocation, femoral and sciatic nerve palsies, lateral femoral  cutaneous nerve injury, wound healing complications, all other organ  disfunction. The patient did freely consent for surgery. DESCRIPTION OF PROCEDURE IN DETAIL: After the correct side and site were  identified by myself in the holding area, the patient was transported to the  surgical suite where after successful induction of spinal  anesthesia, the  patient was transferred to the [facility] table where all bony prominences were  padded. The left hip was then prepped and draped in usual sterile orthopedic  fashion. After an appropriate time out and confirmation the 2 grams of Ancef  had been infused prior to any incision, an incision was made beginning 2 cm  lateral to and distal to the ASIS directly laterally and distally by about 8  cm.  The tissues were dissected sharply down to fascia and strict hemostasis  was maintained with the use of electrocautery. The deep fascia was incised  sharply and the tensor of the fascia stan was bluntly elevated and mobilized. A superolateral retractor was then placed. The deep fascia was divided and the ascending branch of the lateral femoral circumflex artery was identified, coagulated and divided. An inferior medial retractor was then placed giving good visualization of the anterior  capsule. The iliocapsularis was then elevated off of the anterior capsule  and an anterior column retractor was placed. A capsulectomy was performed  anteriorly. Once the capsulectomy was performed,  retractors were placed intra-articularly and an osteotomy was performed in  the femoral neck in line with the preoperative template. Once this was  performed, the femoral head was removed with use of a powered corkscrew. The retractors were placed inside of capsule, outside of labrum and the  circumferential labrectomy was performed. The Pulvinar was excised with use  of electrocautery. The medial tissues were infused with 0.5% Ropivacaine. Once this was performed, a small reamer was then used to medialize  the acetabulum to its true floor. With that, the reamer was increased in  size and then placed in the direction of the anatomic direction of the  acetabulum. Further reaming was performed and care was taken to maintain good medial acetabular wall integrity. I reamed until the instrument had excellent  as well as a bed of bleeding cancellous bone. I, therefore, irrigated the acetabular recess and dried it with a lap and  impacted into place a final shell into place at   43 degrees of abduction as well as 15 degrees of anteversion under direct fluoroscopic visualization. I checked stability of the shell. It was excellent. For added stability, I then placed two 6.5 mm screws in the acetabular safe zone for added shell stability. With that, the  stability was tested and it was found to have an excellent scratch fit. I  therefore irrigated the wound and impacted into a place a 0 degree  polyethylene insert. This did have excellent engagement of the locking  mechanism. This was tested as well. The retractors were then removed and the leg was placed in extended, adducted, externally rotated position and retractors were placed about the proximal femur. The limited capsulotomy was performed at the greater trochanter and this did allow the proximal femur to  elevate up and out of the wound. I then used the box osteotome to clear off  metaphysical bone and the lateral cortical bone was then removed with the use of a rongeur. The canal entry broach was then used. Sequential broaching was then performed until I had good axial and rotational control. I used the calcar planer to clear off irregularities in the calcar. I trial reduced the hip with a 0 degree ball. X-rays were taken and  demonstrated excellent positioning of the femoral component. I redislocated the joint. I removed  the head and neck as well as the broach and impacted into place a  Final permanent Accolade 2 132 degree neck angle hip stem. This did have excellent axial and  rotational control. The calcar was checked and there were no cracks or other  bone deficiencies. I, therefore, trialed a ball which did have excellent  reconstitution of the length and offset. I, therefore, redislocated, cleaned  and dried the trunnion and impacted into place our final ball with seven sharp blows of the mallet. This did have excellent fixation. I therefore relocated the joint. Final  x-rays were taken demonstrating good positioning of all components as well as  stability and no fractures were noted. Stability testing was performed with the leg at neutral, then externally rotated 90 degrees, then externally rotated at 45 degrees combined with leg extension. The hip was stable through this range of motion.   The wound was then copiously  irrigated with normal saline mixed with Betadine. Soft tissues were then  infused with 0.5% Ropivacaine. The fascia was then closed with a running #1  Vicryl suture. Deep stitches were placed. Then, 2-0 Vicryl, 3-0 Monocryl,  Dermabond, and Steri-Strips were applied. A sterile dressing was applied. The patient was then taken off of the table and taken to PACU in stable  condition with no obvious intraoperative complications. POSTOPERATIVE PLAN: The patient will be weightbearing as tolerated. They will  have ecotrin for DVT prophylaxis for  1 month. The patient will follow up in our office in 2 weeks and then 6 weeks for repeat clinical and  radiographic checks per our normal protocol.

## 2023-02-13 NOTE — PROGRESS NOTES
TRANSFER - IN REPORT:    Verbal report received from [facility]) on Lincoln Mercedes  being received from [facility]) for routine post - op      Report consisted of patients Situation, Background, Assessment and   Recommendations(SBAR). Information from the following report(s) SBAR, Kardex, Procedure Summary, Intake/Output, MAR, and Recent Results was reviewed with the receiving nurse. Opportunity for questions and clarification was provided. Assessment completed upon patients arrival to unit and care assumed.

## 2023-02-13 NOTE — ANESTHESIA POSTPROCEDURE EVALUATION
Procedure(s):  LEFT TOTAL HIP ARTHROPLASTY, ANTERIOR APPROACH.    spinal    Anesthesia Post Evaluation      Multimodal analgesia: multimodal analgesia used between 6 hours prior to anesthesia start to PACU discharge  Patient location during evaluation: bedside  Patient participation: complete - patient participated  Level of consciousness: awake  Pain management: adequate  Airway patency: patent  Anesthetic complications: no  Cardiovascular status: acceptable  Respiratory status: acceptable  Hydration status: acceptable  Post anesthesia nausea and vomiting:  controlled  Final Post Anesthesia Temperature Assessment:  Normothermia (36.0-37.5 degrees C)      INITIAL Post-op Vital signs:   Vitals Value Taken Time   BP 98/62 02/13/23 0945   Temp 36.4 °C (97.5 °F) 02/13/23 0853   Pulse 65 02/13/23 0956   Resp 15 02/13/23 0956   SpO2 94 % 02/13/23 0956   Vitals shown include unvalidated device data.

## 2023-02-13 NOTE — DISCHARGE INSTRUCTIONS
Discharge Instructions:  Domínguez Tatianna    Surgery: LEFT TOTAL HIP ARTHROPLASTY, ANTERIOR APPROACH  Surgeon:   Dr. Narinder Hobbs  Surgery Date:  2/13/2023    To relieve pain:  Use ice/gel packs.    -Put the ice pack directly over the wound, or anywhere you are hurting or swollen.   -To control pain and swelling, keep ice on regularly, especially after physical activity.  -The packs should stay cold for 3-4 hours. When it is not cold anymore, rotate with the packs in the freezer. Elevate your leg. This will also keep swelling down. Rest for at least 20 minutes between activity or exercises. To keep track of your pain medications, write down what you take and when you take it. The last dose of pain medication you got in the hospital was:     Medication    Dose    Date & Time      Choose your medications based on the pain scale below: To keep your pain under control, take Tylenol every 6 hours for 14 days - even if you feel like you dont need it. For mild to moderate pain (4-6 on pain scale), take one pain pill every 4 hours or as instructed. For severe pain (7-10 on pain scale), take two pain pills every 4 hours or as instructed. To prevent nausea, take your pain medications with food. Pain Scale              As your pain lessens:    Slowly start taking less pain medication. You may do this by waiting longer between doses or by taking smaller doses. Stop using the pain medications as soon as you no longer need it, usually in 2-3 weeks. Aspirin  To prevent blood clots, you will need to take Aspirin 325 mg twice a day for 30 days. To prevent stomach upset or bleeding:  Do not take non-steroidal anti-inflammatory medications (Ibuprofen, Advil, Motrin, Naproxen, etc.)   Take Pepcid 20 mg twice a day, or a similar home medication, while you are taking a blood thinner. Keep your waterproof dressing in place.  It will be removed by your surgeon during your follow-up appointment in 2 weeks. You may need to change the dressing if you are having drainage to where the dressing is no longer intact. You will be given an extra dressing to use at home. You will have some swelling, warmth, and bruising around the incision and up and down the leg after surgery. This will may get worse in the first few days you are home and will slowly get better over the next few weeks. You may shower with this dressing over your wound. After showering pat the dressing dry. DO NOT's:  Do not rub your surgical wound  Do not put lotion or oils on your wound. Do not take a tub bath or go swimming until your doctor says it is ok. To increase and promote healing:  Stop Smoking (or at least cut back on smoking). Eat a well-balanced diet. High in protein and Vitamin C. If you have a poor appetite, supplement your diet by drinking Ensure, Glucerna or [place] Instant Breakfast for the next 30 days     If you are a diabetic, control you blood sugars to prevent infection and help your wound heal.                     Prevent Infection:    Wash your hands                       -This is the most important thing you or your caregiver can do. -Wash your hands with soap and water (or use the hand ) before you touch any wounds. Shower  -Use the antibacterial soap we gave you when you take a shower.   -Shower with this soap until your wounds are healed. Use Clean Sheets   -Put freshly cleaned sheets on your bed after surgery.   -To keep the surgery site clean, do not allow pets to sleep with you while your wound is still healing. To prevent constipation, stay active and drink plenty of fluid. While using pain medications, you should also take stool softeners and laxatives, such as Pericolace and Miralax.        If you are having too many bowel movements, then you may need to stop taking the laxatives. You should have a bowel movement 3-4 days after surgery and then at least every other day while on pain medication. To improve your recovery, you must be active! Use your walker and take short walks (in your home) about every 2 hours during the day. Try to increase how far you walk each day. You can put as much weight on your leg as you can tolerate while walking. Home health physical therapy will come to your home the day after you leave the hospital. The therapist will visit about 4 times over the next couple of weeks to teach you exercises, how to get out of bed and how to safely walk in your home. NO DRIVING until your surgeon tells you it is ok. You can return to work when cleared by a physician. Please call your physician immediately if you have:  Constant bleeding from your wound. Increasing redness or swelling around your wound (some warmth, bruising and swelling is normal). Change in wound drainage (increase in amount, color, or bad smell). Change in mental status (unusual behavior). Temperature over 101.5 degrees Fahrenheit   Pain or redness in the calf (back of your lower leg)  Increased swelling of the thigh, ankle, calf, or foot. Emergency! CALL 911 if you have:  Shortness of breath  Chest pain when you cough or taking a deep breath        Please call your surgeons office to make your follow up appointment in 2 weeks. If you have questions or concerns during normal business hours, you may reach Dr. Aaliyah Oreilly Team at [phone].                              Instructions for 24 hours after receiving General Anesthesia or Intravenous Sedation,   and while taking prescription Narcotics    Common side effects associated with each of these medications includes:  - Drowsiness, dizziness, euphoria, sleepiness or confusion  - Impaired memory recall  - Unsteady gait, loss of fine muscle control and delayed reaction time  - Visual disturbances, difficulty focusing, blurred vision    You may experience some of these side effects or you may not be aware of subtle changes in your behavior or reaction time. Because you received these medications, we are giving you the following instructions. Discharge Instructions:   - Someone should be with you for the next 24 hours  - For your own safety, a responsible adult must drive you home  - Do not consume alcoholic beverages   - Do not make important personal, legal or business decisions for 24 hours  - Move slowly and carefully, do not make sudden position changes. Be alert for dizziness or lightheadedness and move accordingly. - Do not operate equipment for 24 hours - [employer], power tools, Kitchen accessories: stove, etc.  - If you have not urinated within 8 hours after discharge, please contact your surgeon's office.

## 2023-02-13 NOTE — PROGRESS NOTES
Ortho / Neurosurgery NP Note    POD# 0  s/p LEFT TOTAL HIP ARTHROPLASTY, ANTERIOR APPROACH   Pt seen with Dr. Anupam Andrade. Pt resting in bed. Awake and alert, smiling. Reports postop pain well controlled with oxycodone. No other complaints. Tolerating PO with no N/V     VSS Afebrile. 2L NC     Visit Vitals  /78   Pulse 83   Temp 98 °F (36.7 °C)   Resp 19   Ht (P) 5' 5\" (1.651 m)   Wt (P) 80.1 kg (176 lb 9.4 oz)   SpO2 98%   BMI (P) 29.39 kg/m²       Voiding status: voiding   Output (mL)  Urine Voided: 700 ml (02/13/23 1234)  Last Bowel Movement Date: 02/13/23 (02/13/23 1052)      Labs    Lab Results   Component Value Date/Time    HGB 13.9 02/06/2023 08:07 AM      Lab Results   Component Value Date/Time    INR 1.0 02/06/2023 08:07 AM      Lab Results   Component Value Date/Time    Sodium 138 02/06/2023 08:07 AM    Potassium 3.9 02/06/2023 08:07 AM    Chloride 107 02/06/2023 08:07 AM    CO2 24 02/06/2023 08:07 AM    Glucose 113 (H) 02/06/2023 08:07 AM    BUN 20 02/06/2023 08:07 AM    Creatinine 1.11 (H) 02/06/2023 08:07 AM    Calcium 9.2 02/06/2023 08:07 AM     Recent Glucose Results: No results found for: GLU, GLUPOC, GLUCPOC        Body mass index is 29.39 kg/m² (pended). : A BMI > 30 is classified as obesity and > 40 is classified as morbid obesity. Dressing c.d.i  Cryotherapy in place over incision  Calves soft and supple; No pain with passive stretch  Sensation and motor intact.  +PF/DF/EHL intact   SCDs for mechanical DVT proph while in bed     PLAN:  1) PT BID, OT - WBAT  2) Aspirin 325 mg PO BID for DVT Prophylaxis   3) GI Prophylaxis - Pepcid  4) Pain control - scheduled tylenol, and prn  oxycodone    5) Readniess for discharge:     [x] Vital Signs stable    [] Hgb stable    [x] + Voiding    [x] Wound intact, drainage minimal    [x] Tolerating PO intake     [] Cleared by PT (OT if applicable)     [] Stair training completed (if applicable)    [] Independent / Contact Guard Assist (household distance)     [] Bed mobility     [] Car transfers     [] ADLs    [x] Adequate pain control on oral medication alone     Discharge home with [place] later today if able to clear therapy.      Kade Roper, NP

## 2023-02-13 NOTE — PROGRESS NOTES
-Transition of Care Plan:  RUR: n/a outpatient status   Disposition: home with home health - [facility] accepted   If SNF or IPR: Date FOC offered:  Date FOC received:  Date authorization started with reference number:  Date authorization received and expires:  Accepting facility:  Follow up appointments: ortho  DME needed: pt checking with friend to see if they have a RW to borrow  Transportation at Discharge:    Laura Holguin or means to access home:         IM Medicare Letter: n/a  Caregiver Contact:  Michael Liner [phone]  Discharge Caregiver contacted prior to discharge? Yes- at bedside   Care Conference needed?: no                   Reason for Admission: left total hip arthroplasty                       RUR Score: n/a outpatient status                     Plan for utilizing home health: per recommendation         PCP: First and Last name:  Angeles Wise MD   Name of Practice: unable recall    Are you a current patient: Yes/No: yes   Approximate date of last visit: 1 wk ago    Can you participate in a virtual visit with your PCP: yes                    Current Advanced Directive/Advance Care Plan: patient declined at this time       Healthcare Decision Maker:   Click here to complete [address] including selection of the Healthcare Decision Maker Relationship (ie \"Primary\")                             Transition of Care Plan:                      CM made room visit with pt and  at bedside. Pt confirmed demographics, insurance, and emergency contact on file. Pt and  live in a 3 story home with 5 leny. Pt has a cane and raised toilet seat. Pt checking with friend to see if they have a RW she can borrow. At baseline pt independent with ADLs and driving. Pt has no hx of HH, SNF, or IPR. Pt plan to d/c home with home health. Pt voiced no preference in [facility]. Referral sent to [facility] and accepted.  to provide transport at d/c.      Care Management Interventions  PCP Verified by CM: Yes  Mode of Transport at Discharge:  Other (see comment) ( )  Transition of Care Consult (CM Consult): Discharge Planning, Home Health  Discharge Durable Medical Equipment: No  Physical Therapy Consult: Yes  Occupational Therapy Consult: Yes  Speech Therapy Consult: No  Support Systems: Spouse/Significant Other  Confirm Follow Up Transport: Family  Discharge Location  Patient Expects to be Discharged to[de-identified] Home with home health    [facility], [place], [address]

## 2023-02-13 NOTE — PERIOP NOTES
[facility] from Operating Room to PACU    Report received from [facility],[address] and Manny Brady CRNA regarding Yvonne Fitzpatrick. Surgeon(s):  Fe Mares DO  And Procedure(s) (LRB):  LEFT TOTAL HIP ARTHROPLASTY, ANTERIOR APPROACH (Left)  confirmed   with allergies and dressings discussed. Anesthesia type, drugs, patient history, complications, estimated blood loss, vital signs, intake and output, and last pain medication, lines, and temperature were reviewed.

## 2023-02-13 NOTE — ANESTHESIA PROCEDURE NOTES
Spinal Block    Start time: 2/13/2023 7:19 AM  End time: 2/13/2023 7:22 AM  Performed by: Oralia Faulkner MD  Authorized by: Oralia Faulkner MD     Pre-procedure:   Indications: at surgeon's request and primary anesthetic  Preanesthetic Checklist: patient identified, risks and benefits discussed, anesthesia consent, site marked, patient being monitored, timeout performed and fire risk safety assessment completed and verbalized    Timeout Time: 07:19 EST      Spinal Block:   Patient Position:  Seated  Prep Region:  Lumbar  Prep: DuraPrep      Location:  L3-4  Technique:  Single shot      Med Admin Time: 2/13/2023 7:22 AM    Needle:   Needle Type:  Pencan  Needle Gauge:  25 G  Attempts:  1      Events: CSF confirmed, no blood with aspiration and no paresthesia        Assessment:  Insertion:  Uncomplicated  Patient tolerance:  Patient tolerated the procedure well with no immediate complications

## 2023-02-14 VITALS
HEART RATE: 65 BPM | RESPIRATION RATE: 20 BRPM | DIASTOLIC BLOOD PRESSURE: 76 MMHG | SYSTOLIC BLOOD PRESSURE: 118 MMHG | TEMPERATURE: 98.1 F | OXYGEN SATURATION: 97 %

## 2023-02-14 LAB
ANION GAP SERPL CALC-SCNC: 6 MMOL/L (ref 5–15)
BUN SERPL-MCNC: 10 MG/DL (ref 6–20)
BUN/CREAT SERPL: 12 (ref 12–20)
CALCIUM SERPL-MCNC: 8.7 MG/DL (ref 8.5–10.1)
CHLORIDE SERPL-SCNC: 109 MMOL/L (ref 97–108)
CO2 SERPL-SCNC: 25 MMOL/L (ref 21–32)
CREAT SERPL-MCNC: 0.85 MG/DL (ref 0.55–1.02)
GLUCOSE SERPL-MCNC: 115 MG/DL (ref 65–100)
HGB BLD-MCNC: 11.1 G/DL (ref 11.5–16)
POTASSIUM SERPL-SCNC: 4.1 MMOL/L (ref 3.5–5.1)
SODIUM SERPL-SCNC: 140 MMOL/L (ref 136–145)

## 2023-02-14 PROCEDURE — 97116 GAIT TRAINING THERAPY: CPT

## 2023-02-14 PROCEDURE — 97535 SELF CARE MNGMENT TRAINING: CPT | Performed by: OCCUPATIONAL THERAPIST

## 2023-02-14 PROCEDURE — 36415 COLL VENOUS BLD VENIPUNCTURE: CPT

## 2023-02-14 PROCEDURE — 74011250637 HC RX REV CODE- 250/637: Performed by: ORTHOPAEDIC SURGERY

## 2023-02-14 PROCEDURE — 97530 THERAPEUTIC ACTIVITIES: CPT

## 2023-02-14 PROCEDURE — 80048 BASIC METABOLIC PNL TOTAL CA: CPT

## 2023-02-14 PROCEDURE — 97165 OT EVAL LOW COMPLEX 30 MIN: CPT | Performed by: OCCUPATIONAL THERAPIST

## 2023-02-14 PROCEDURE — 77010033678 HC OXYGEN DAILY

## 2023-02-14 PROCEDURE — 85018 HEMOGLOBIN: CPT

## 2023-02-14 PROCEDURE — 74011250637 HC RX REV CODE- 250/637: Performed by: NURSE PRACTITIONER

## 2023-02-14 PROCEDURE — 94760 N-INVAS EAR/PLS OXIMETRY 1: CPT

## 2023-02-14 RX ORDER — ACETAMINOPHEN 500 MG
1000 TABLET ORAL EVERY 6 HOURS
Qty: 56 TABLET | Refills: 0 | Status: SHIPPED | OUTPATIENT
Start: 2023-02-14 | End: 2023-02-21

## 2023-02-14 RX ORDER — OXYCODONE HYDROCHLORIDE 5 MG/1
5-10 TABLET ORAL
Qty: 40 TABLET | Refills: 0 | Status: SHIPPED | OUTPATIENT
Start: 2023-02-14 | End: 2023-02-21

## 2023-02-14 RX ORDER — ASPIRIN 325 MG
325 TABLET, DELAYED RELEASE (ENTERIC COATED) ORAL 2 TIMES DAILY
Qty: 60 TABLET | Refills: 0 | Status: SHIPPED | OUTPATIENT
Start: 2023-02-14 | End: 2023-03-16

## 2023-02-14 RX ORDER — AMOXICILLIN 250 MG
1 CAPSULE ORAL 2 TIMES DAILY
Qty: 14 TABLET | Refills: 0 | Status: SHIPPED | OUTPATIENT
Start: 2023-02-14 | End: 2023-02-21

## 2023-02-14 RX ORDER — POLYETHYLENE GLYCOL 3350 17 G/17G
17 POWDER, FOR SOLUTION ORAL DAILY
Qty: 7 PACKET | Refills: 0 | Status: SHIPPED | OUTPATIENT
Start: 2023-02-15 | End: 2023-02-22

## 2023-02-14 RX ADMIN — ESCITALOPRAM OXALATE 20 MG: 10 TABLET ORAL at 08:26

## 2023-02-14 RX ADMIN — CETIRIZINE HYDROCHLORIDE 10 MG: 10 TABLET, FILM COATED ORAL at 08:26

## 2023-02-14 RX ADMIN — FAMOTIDINE 20 MG: 20 TABLET, FILM COATED ORAL at 08:26

## 2023-02-14 RX ADMIN — OXYCODONE 10 MG: 5 TABLET ORAL at 04:26

## 2023-02-14 RX ADMIN — ASPIRIN 325 MG: 325 TABLET, COATED ORAL at 08:26

## 2023-02-14 RX ADMIN — Medication 1 AMPULE: at 08:25

## 2023-02-14 RX ADMIN — Medication 1 CAPSULE: at 08:26

## 2023-02-14 RX ADMIN — Medication 1 TABLET: at 08:26

## 2023-02-14 RX ADMIN — DOCUSATE SODIUM - SENNOSIDES 1 TABLET: 50; 8.6 TABLET, FILM COATED ORAL at 08:26

## 2023-02-14 RX ADMIN — OXYCODONE 10 MG: 5 TABLET ORAL at 08:26

## 2023-02-14 NOTE — PROGRESS NOTES
OCCUPATIONAL THERAPY EVALUATION/DISCHARGE  Patient: Rayshawn Padilla (43 y.o. female)  Date: 2/14/2023  Primary Diagnosis: Unilateral primary osteoarthritis, left hip [M16.12]  Procedure(s) (LRB):  LEFT TOTAL HIP ARTHROPLASTY, ANTERIOR APPROACH (Left) 1 Day Post-Op   Precautions:  WBAT, Fall    ASSESSMENT  Based on the objective data described below, the patient presents at an overall Min A level for basic self-care tasks. She was ready for discharge on arrival. Patient is familiar with adaptive equipment. Her  used it after a previous surgery. Patient requires cues for safety throughout. Difficulty noted with advancing L LE. Provided cues to keep the RW closer and to push down through B UE to enable her to more efficiently advance her legs, which helped. Discussed bathroom safety and equipment. Patient is getting ready to leave and will continue with outpatient physical therapy. Encouraged her to request OT services if she or her  feel they are necessary, but she indicates that he will assist her with ADL if needed. Current Level of Function (ADLs/self-care): Min A to Supervision    Functional Outcome Measure: The patient scored 70/100 on the Barthel Index     Other factors to consider for discharge: N/A     PLAN :  Recommendation for discharge: (in order for the patient to meet his/her long term goals)  No skilled occupational therapy/ follow up rehabilitation needs identified at this time. This discharge recommendation:  A follow-up discussion with the attending provider and/or case management is planned    IF patient discharges home will need the following DME: none       SUBJECTIVE:   Patient stated I really just want to get out of here.     OBJECTIVE DATA SUMMARY:   HISTORY:   Past Medical History:   Diagnosis Date    Adverse effect of anesthesia     \"heaviness in chest\" after shoulder surgery per pt    Arthritis     spine, hip    At risk for sleep apnea 2023    ARTEMIO score 4.  STOP BANG tool form sent to PCP    Chronic pain     hip    GERD (gastroesophageal reflux disease)     Psychiatric disorder     anxiety and depression    Seizures ([facility] [address]     epilepsy     Past Surgical History:   Procedure Laterality Date    HX BREAST REDUCTION  2014    HX CHOLECYSTECTOMY  1984    HX GYN          HX HYSTERECTOMY      HX ORTHOPAEDIC Right 2004    shoulder       Prior Level of Function/Environment/Context: Independent with basic self-care and IADL. Expanded or extensive additional review of patient history:     Home Situation  Home Environment: Private residence  # Steps to Enter: 5  Rails to Enter: Yes  Hand Rails : Bilateral  One/Two Story Residence: Other (Comment)  Living Alone: No  Support Systems: Spouse/Significant Other  Patient Expects to be Discharged to[de-identified] Home with home health  Current DME Used/Available at Home: Cane, straight, Raised toilet seat  Tub or Shower Type: Shower    Hand dominance: Right    EXAMINATION OF PERFORMANCE DEFICITS:  Cognitive/Behavioral Status:  Neurologic State: Alert  Orientation Level: Oriented X4  Cognition: Follows commands  Perception: Appears intact  Perseveration: No perseveration noted  Safety/Judgement: Awareness of environment    Hearing: Auditory  Auditory Impairment: None    Range of Motion:  AROM: Within functional limits (B UE)                         Strength:  Strength: Within functional limits (B UE)                Coordination:     Fine Motor Skills-Upper: Left Intact; Right Intact    Gross Motor Skills-Upper: Left Intact; Right Intact    Tone & Sensation:  Tone: Normal  Sensation: Intact                      Balance:  Sitting: Intact  Standing: Impaired  Standing - Static: Fair;Constant support  Standing - Dynamic : Fair;Constant support    Functional Mobility and Transfers for ADLs:  Bed Mobility:       Transfers:  Sit to Stand: Supervision;Stand-by assistance  Stand to Sit: Stand-by assistance  Bed to Chair: Stand-by assistance (cues for safety)  Bathroom Mobility: Stand-by assistance  Toilet Transfer : Stand-by assistance  Assistive Device : Walker, rolling    ADL Assessment:  Feeding: Independent    Oral Facial Hygiene/Grooming: Stand-by assistance    Bathing: Minimum assistance         Upper Body Dressing: Independent    Lower Body Dressing: Minimum assistance    Toileting: Minimum assistance                ADL Intervention and task modifications:                                          Cognitive Retraining  Safety/Judgement: Awareness of environment    Precautions: Patient recalled and demonstrated avoiding extreme planes of movement with Left LE during ADLs and functional mobility       Bathing: Patient instructed when bathing to not submerge wound in water, stand to shower or sponge bathe, cover wound with plastic and tape to ensure no water reaches bandage/wound without cues. Patient indicated understanding. Dressing joint: Discussed compensatory techniques for lower body ADL, including adaptive equipment. She is familiar with use. She declined trial at this time. Home safety: Patient instructed on home modifications and safety (raise height of ADL objects, appropriate height of chair surfaces, recliner safety, change of floor surfaces, clear pathways) to increase independence and fall prevention. Patient indicated understanding. Rolling Walker Safety: Educated patient about importance of keeping hands free at all times when using rolling walker for fall prevention. Provided information about walker bags/baskets/trays to assist with transporting items safely while in the home to prevent falls. Patient indicated understanding of same. Instructed patient on safe and appropriate hand placement during transfers (push up from sitting surface when standing up, reach back for sitting surface when sitting down, use grab bars, etc.), including never to pull up on rolling walker for fall prevention and safety.  Instructed patient to push rolling walker up to surface (countertop, sink, etc.) and  it as opposed to abandoning rolling walker on the side for safety. Patient verbalized understanding. Functional Measure:    Barthel Index:  Bathin  Bladder: 10  Bowels: 10  Groomin  Dressin  Feeding: 10  Mobility: 10  Stairs: 5  Toilet Use: 5  Transfer (Bed to Chair and Back): 10  Total: 70/100      The Barthel ADL Index: Guidelines  1. The index should be used as a record of what a patient does, not as a record of what a patient could do. 2. The main aim is to establish degree of independence from any help, physical or verbal, however minor and for whatever reason. 3. The need for supervision renders the patient not independent. 4. A patient's performance should be established using the best available evidence. Asking the patient, friends/relatives and nurses are the usual sources, but direct observation and common sense are also important. However direct testing is not needed. 5. Usually the patient's performance over the preceding 24-48 hours is important, but occasionally longer periods will be relevant. 6. Middle categories imply that the patient supplies over 50 per cent of the effort. 7. Use of aids to be independent is allowed. Score Interpretation (from [address] [address])    Independent   60-79 Minimally independent   40-59 Partially dependent   20-39 Very dependent   <20 Totally dependent     -Cintia Mayfield., Barthel, D.W. (1965). Functional evaluation: the Barthel Index. [facility] ([address] (). The Barthel activities of daily living index: self-reporting versus actual performance in the old (> or = 75 years). Journal of [address] [place] [address] 45(7), [facility], J.J.M.F, Cata Inch., Jp Gutiérrez. (1999).  Measuring the change in disability after inpatient rehabilitation; comparison of the responsiveness of the Barthel Index and Functional [place] Measure. Journal of Neurology, Neurosurgery, and Psychiatry, 66(4), 803-549. MAMADOU Sears.ZABRINA, CIRILO Fields, & Dominick Abdullahi M.A. (2004) Assessment of post-stroke quality of life in cost-effectiveness studies: The usefulness of the Barthel Index and the EuroQoL-5D. Quality of Life Research, 15, 260-40        Occupational Therapy Evaluation Charge Determination   History Examination Decision-Making   LOW Complexity : Brief history review  LOW Complexity : 1-3 performance deficits relating to physical, cognitive , or psychosocial skils that result in activity limitations and / or participation restrictions  LOW Complexity : No comorbidities that affect functional and no verbal or physical assistance needed to complete eval tasks       Based on the above components, the patient evaluation is determined to be of the following complexity level: LOW   Pain Ratin/10 L hip; RN is aware but it was not time for pain medication    Activity Tolerance:   Fair      After treatment patient left in no apparent distress:    Sitting in chair and Call bell within reach    COMMUNICATION/EDUCATION:   The patients plan of care was discussed with: Physical therapy assistant and Registered nurse.      Thank you for this referral.  Augustina Martinez OTR/L  Time Calculation: 17 mins

## 2023-02-14 NOTE — PROGRESS NOTES
Problem: Mobility Impaired (Adult and Pediatric)  Goal: *Acute Goals and Plan of Care (Insert Text)  Description: FUNCTIONAL STATUS PRIOR TO ADMISSION: Patient was modified independent using a single point cane for functional mobility. HOME SUPPORT PRIOR TO ADMISSION: The patient lived with . Physical Therapy Goals  Initiated 2/13/2023  1. Patient will move from supine to sit and sit to supine  in bed with independence within 7 day(s). 2.  Patient will transfer from bed to chair and chair to bed with modified independence using the least restrictive device within 7 day(s). 3.  Patient will perform sit to stand with modified independence within 7 day(s). 4.  Patient will ambulate with modified independence for 150 feet with the least restrictive device within 7 day(s). 5.  Patient will ascend/descend 5 stairs with 1 handrail(s) with modified independence within 7 day(s). Outcome: Resolved/Met   PHYSICAL THERAPY TREATMENT  Patient: Anatoly Huffman (89 y.o. female)  Date: 2/14/2023  Diagnosis: Unilateral primary osteoarthritis, left hip [M16.12] <principal problem not specified>  Procedure(s) (LRB):  LEFT TOTAL HIP ARTHROPLASTY, ANTERIOR APPROACH (Left) 1 Day Post-Op  Precautions: WBAT, Fall  Chart, physical therapy assessment, plan of care and goals were reviewed. ASSESSMENT  Patient continues with skilled PT services and is progressing towards goals. Pt presents with decreased strength and increased pain with mobility. Pt performed bed mobility at [facility]. Pt performed sit to stand transfer at [facility]/Supervision. Pt ambulated 100ft and 85ft with RW at [facility]/SBA. Pt ascended/descended 4 steps with the right raling at [facility]/SBA. Pt performed a car transfer at [facility] with cueing for sequencing. Pt moving well but with increased pain. From physical therapy stand point pt cleared for discharge.       Current Level of Function Impacting Discharge (mobility/balance): mobility at [facility]/SBA     Other factors to consider for discharge: pain          PLAN :  Patient continues to benefit from skilled intervention to address the above impairments. Continue treatment per established plan of care. to address goals. Recommendation for discharge: (in order for the patient to meet his/her long term goals)  Physical therapy at least 2 days/week in the home     This discharge recommendation:  Has been made in collaboration with the attending provider and/or case management    IF patient discharges home will need the following DME: rolling walker       SUBJECTIVE:   Patient stated  It hurts so bad but I can't wait to move around with my granddaughter.     OBJECTIVE DATA SUMMARY:   Critical Behavior:  Neurologic State: Alert  Orientation Level: Oriented X4  Cognition: Follows commands  Safety/Judgement: Awareness of environment  Functional Mobility Training:  Bed Mobility:  Rolling: Stand-by assistance  Supine to Sit: Stand-by assistance  Sit to Supine: Stand-by assistance  Scooting: Stand-by assistance        Transfers:  Sit to Stand: Supervision;Stand-by assistance  Stand to Sit: Stand-by assistance        Bed to Chair: Stand-by assistance (cues for safety)                    Balance:  Sitting: Intact  Standing: Impaired  Standing - Static: Fair;Constant support  Standing - Dynamic : Fair;Constant support  Ambulation/Gait Training:  Distance (ft): 185 Feet (ft)  Assistive Device: Gait belt;Walker, rolling  Ambulation - Level of Assistance: Contact guard assistance;Stand-by assistance        Gait Abnormalities: Antalgic;Decreased step clearance        Base of Support: Widened  Stance: Left decreased  Speed/Talisha: Pace decreased (<100 feet/min)  Step Length: Right shortened;Left shortened     Stairs:  Number of Stairs Trained: 4  Stairs - Level of Assistance: Contact guard assistance;Stand-by assistance   Rail Use: Right         Pain Rating:  Pt with increased pain with mobility     Activity Tolerance:   WNL, Good, and requires rest breaks      After treatment patient left in no apparent distress:   Supine in bed, Call bell within reach, and Side rails x 3    COMMUNICATION/COLLABORATION:   The patients plan of care was discussed with: Registered nurse.      Shantal Chamorro PTA   Time Calculation: 30 mins

## 2023-02-14 NOTE — PROGRESS NOTES
Transition of Care Plan:  RUR: n/a outpatient status   Disposition: home with home health - [facility] accepted   Follow up appointments: ortho  DME needed: RW provided  Transportation at Discharge:    Larraine Due or means to access home:         IM Medicare Letter: n/a  Caregiver Contact:  Aisha Worrell [phone]  Discharge Caregiver contacted prior to discharge? Yes- at bedside   Care Conference needed?: no       [facility] accepted for [facility] services. Referral sent to [place] for RW and approved. RW provided to patient at bedside.      [id number] [facility] [address], [place], [address]

## 2023-02-14 NOTE — PROGRESS NOTES
End of Shift Note    Bedside shift change report given to Ford Jean (oncoming nurse) by Eloy Huitron LPN (offgoing nurse). Report included the following information SBAR and Kardex    Shift worked: Night      Shift summary and any significant changes:    POD1. Up with 1 w/ walker to BR. Voiding, clear/yellow urine without difficulty. Oxycodone prn for pain. VS stable. Concerns for physician to address: None       Zone phone for oncoming shift:   [phone]     Activity:  Activity Level: Up with Assistance  Number times ambulated in hallways past shift: 0  Number of times OOB to chair past shift: 2    Cardiac:   Cardiac Monitoring: No      Cardiac Rhythm: Sinus Rhythm    Access:  Current line(s): PIV     Genitourinary:   Urinary status: voiding    Respiratory:   O2 Device: Nasal cannula  Chronic home O2 use?: NO  Incentive spirometer at bedside: YES       GI:  Last Bowel Movement Date: 02/13/23  Current diet:  ADULT DIET Regular  Passing flatus: YES  Tolerating current diet: YES       Pain Management:   Patient states pain is manageable on current regimen: YES    Skin:  Chandan Score: 20  Interventions: increase time out of bed, PT/OT consult, limit briefs, internal/external urinary devices, and nutritional support     Patient Safety:  Fall Score:  Total Score: 3  Interventions: bed/chair alarm, assistive device (walker, cane, etc), gripper socks, pt to call before getting OOB, and stay with me (per policy)  High Fall Risk: Yes    Length of Stay:  Expected LOS: - - -  Actual LOS: 0

## 2023-02-14 NOTE — PROGRESS NOTES
Problem: Falls - Risk of  Goal: *Absence of Falls  Description: Document Maxine Christy Fall Risk and appropriate interventions in the flowsheet.   Outcome: Progressing Towards Goal  Note: Fall Risk Interventions:  Mobility Interventions: Bed/chair exit alarm, Patient to call before getting OOB         Medication Interventions: Bed/chair exit alarm, Patient to call before getting OOB    Elimination Interventions: Bed/chair exit alarm, Call light in reach, Patient to call for help with toileting needs, Stay With Me (per policy)              Problem: Patient Education: Go to Patient Education Activity  Goal: Patient/Family Education  Outcome: Progressing Towards Goal     Problem: Patient Education: Go to Patient Education Activity  Goal: Patient/Family Education  Outcome: Progressing Towards Goal

## 2023-02-14 NOTE — PROGRESS NOTES
Ortho / Neurosurgery NP Note    POD# 1  s/p LEFT TOTAL HIP ARTHROPLASTY, ANTERIOR APPROACH   Pt seen with  at bedside. Pt ambulating from bathroom. Slow, antalgic gait but no LOB  Reports appropriate postop pain relieved by oxycodone. BP soft overnight, denies dizziness   No other complaints. Motivated to work with therapy and return home today. Tolerating regular diet. No nausea. VSS Afebrile. Room air. Visit Vitals  /74 (BP 1 Location: Left upper arm)   Pulse 75   Temp 99.3 °F (37.4 °C)   Resp 17   Ht (P) 5' 5\" (1.651 m)   Wt (P) 80.1 kg (176 lb 9.4 oz)   SpO2 100%   BMI (P) 29.39 kg/m²       Voiding status: voiding   Output (mL)  Urine Voided: 850 ml (02/13/23 1407)  Last Bowel Movement Date: 02/13/23 (02/13/23 2046)  Unmeasurable Output  Urine Occurrence(s): 1 (02/14/23 0424)      Labs    Lab Results   Component Value Date/Time    HGB 11.1 (L) 02/14/2023 03:23 AM      Lab Results   Component Value Date/Time    INR 1.0 02/06/2023 08:07 AM      Lab Results   Component Value Date/Time    Sodium 140 02/14/2023 03:23 AM    Potassium 4.1 02/14/2023 03:23 AM    Chloride 109 (H) 02/14/2023 03:23 AM    CO2 25 02/14/2023 03:23 AM    Glucose 115 (H) 02/14/2023 03:23 AM    BUN 10 02/14/2023 03:23 AM    Creatinine 0.85 02/14/2023 03:23 AM    Calcium 8.7 02/14/2023 03:23 AM     Recent Glucose Results:   Lab Results   Component Value Date/Time     (H) 02/14/2023 03:23 AM           Body mass index is 29.39 kg/m² (pended). : A BMI > 30 is classified as obesity and > 40 is classified as morbid obesity. Dressing c.d.i  Cryotherapy in place over incision  Calves soft and supple; No pain with passive stretch  Sensation and motor intact.  +PF/DF/EHL intact   SCDs for mechanical DVT proph while in bed     PLAN:  1) PT BID, OT - WBAT  2) Aspirin 325 mg PO BID for DVT Prophylaxis   3) GI Prophylaxis - Pepcid  4) Pain control - scheduled tylenol, and prn  oxycodone    5) Readniess for discharge:     [x] Vital Signs stable    [x] Hgb stable    [x] + Voiding    [x] Wound intact, drainage minimal    [x] Tolerating PO intake     [] Cleared by PT (OT if applicable)     [] Stair training completed (if applicable)    [] Independent / Contact Guard Assist (household distance)     [] Bed mobility     [] Car transfers     [] ADLs    [x] Adequate pain control on oral medication alone     Discharge home today with [place] [facility] 's support pending therapy clearance.      Tyrel Estes NP

## 2023-02-14 NOTE — DISCHARGE SUMMARY
Ortho Discharge Summary    Patient ID:  Carmen Mckenna  [id number]  female  65 y.o.  1960    Admit date: 2/13/2023    Discharge date: 2/14/2023    Admitting Physician: Jasmyne Jj DO     Consulting Physician(s):   Treatment Team: Care Manager: Sushila Edwards; Occupational Therapist: Lucho Buck OTR/L; Physical Therapist: Brayden Norton PTA; Utilization Review: Dory Montemayor    Date of Surgery:   2/13/2023     Preoperative Diagnosis:  Unilateral primary osteoarthritis, left hip [M16.12]    Postoperative Diagnosis:   Unilateral primary osteoarthritis, left hip [M16.12]    Procedure(s):   LEFT TOTAL HIP ARTHROPLASTY, ANTERIOR APPROACH     Anesthesia Type:   Spinal     Surgeon: Kim Rader DO                            HPI:  Pt is a 65 y.o. female who has a history of Unilateral primary osteoarthritis, left hip [M16.12]  with pain and limitations of activities of daily living who presents at this time for a LEFT TOTAL HIP ARTHROPLASTY, ANTERIOR APPROACH following the failure of conservative management. PMH:   Past Medical History:   Diagnosis Date    Adverse effect of anesthesia     \"heaviness in chest\" after shoulder surgery per pt    Arthritis     spine, hip    At risk for sleep apnea 2023    ARTEMIO score 4. STOP BANG tool form sent to PCP    Chronic pain     hip    GERD (gastroesophageal reflux disease)     Psychiatric disorder     anxiety and depression    Seizures ([facility]     epilepsy       Body mass index is 29.39 kg/m² (pended). : A BMI > 30 is classified as obesity and > 40 is classified as morbid obesity. Medications upon admission :   Prior to Admission Medications   Prescriptions Last Dose Informant Patient Reported? Taking? Calcium-Cholecalciferol, D3, (Calcium 600 with Vitamin D3) 600 mg-10 mcg (400 unit) chew 2/10/2023  Yes No   Sig: Take  by mouth daily. Cetirizine (ZyrTEC) 10 mg cap 2/13/2023 at AM  Yes Yes   Sig: Take  by mouth.    Lactobac no.41/Bifidobact no.7 (PROBIOTIC-10 PO) 2/6/2023  Yes Yes   Sig: Take  by mouth daily. escitalopram oxalate (LEXAPRO) 20 mg tablet 2/13/2023 at AM  Yes Yes   Sig: TAKE 1 TABLET BY MOUTH EVERY DAY   hydrOXYzine HCL (ATARAX) 25 mg tablet 2/12/2023 at PM  Yes Yes   Sig: Take 25 mg by mouth nightly. ibuprofen (MOTRIN) 200 mg tablet 1/13/2023  Yes Yes   Sig: Take 200 mg by mouth daily as needed for Pain. mupirocin (BACTROBAN) 2 % ointment   No No   Sig: by Both Nostrils route two (2) times a day for 5 days. pantoprazole (PROTONIX) 40 mg tablet 2/13/2023 at AM  Yes Yes   Sig: Take 40 mg by mouth daily. Facility-Administered Medications: None        Allergies: Allergies   Allergen Reactions    Mushroom Itching and Swelling     Throat swells up    Codeine Sulfate Unknown (comments)     Chest tightness    Contrast Dye [Iodine] Shortness of Breath        Hospital Course: The patient underwent surgery. Complications:  None; patient tolerated the procedure well. Was taken to the PACU in stable condition and then transferred to the ortho floor. Perioperative Antibiotics:  Ancef     Postoperative Pain Management:  Oxycodone & Tylenol     DVT Prophylaxis: Aspirin 325BID    Postoperative transfusions:    Number of units banked PRBCs =   none     Post Op complications: none    Hemoglobin at discharge:    Lab Results   Component Value Date/Time    HGB 11.1 (L) 02/14/2023 03:23 AM    INR 1.0 02/06/2023 08:07 AM       Dressing remained clean, dry and intact. No significant erythema or swelling. Wound appears to be healing without any evidence of infection. Neurovascular exam found to be within normal limits. Physical Therapy started following surgery and participated in bed mobility, transfers and ambulation.         Gait:  Gait  Base of Support: Widened  Speed/Talisha: Pace decreased (<100 feet/min)  Step Length: Right shortened, Left shortened  Stance: Left decreased  Gait Abnormalities: Antalgic, Decreased step clearance  Ambulation - Level of Assistance: Contact guard assistance, Stand-by assistance  Distance (ft): 185 Feet (ft)  Assistive Device: Gait belt, Walker, rolling  Rail Use: Right   Stairs - Level of Assistance: Contact guard assistance, Stand-by assistance  Number of Stairs Trained: 4                   Discharged to: Home with HH. Condition on Discharge:   stable    Discharge instructions:  - Anticoagulate with Aspirin 325 BID  - Take pain medications as prescribed  - Resume pre hospital diet      - Discharge activity: activity as tolerated  - Ambulate with assistive device as needed. - Weight bearing status - WBAT  - Wound Care Keep wound clean and dry. See discharge instruction sheet. -DISCHARGE MEDICATION LIST     Discharge Medication List as of 2/14/2023 11:45 AM        START taking these medications    Details   acetaminophen (TYLENOL) 500 mg tablet Take 2 Tablets by mouth every six (6) hours for 7 days. , Normal, Disp-56 Tablet, R-0      aspirin delayed-release 325 mg tablet Take 1 Tablet by mouth two (2) times a day for 30 days. , Normal, Disp-60 Tablet, R-0      oxyCODONE IR (ROXICODONE) 5 mg immediate release tablet Take 1-2 Tablets by mouth every four (4) hours as needed for Pain for up to 7 days. Max Daily Amount: 60 mg. One tab for mild to moderate pain level 1-6, or 2 tabs for severe pain level 7-10, Normal, Disp-40 Tablet, R-0      polyethylene glycol (MIRALAX) 17 gram packet Take 1 Packet by mouth daily for 7 days. , Normal, Disp-7 Packet, R-0      senna-docusate (PERICOLACE) 8.6-50 mg per tablet Take 1 Tablet by mouth two (2) times a day for 7 days. , Normal, Disp-14 Tablet, R-0           CONTINUE these medications which have NOT CHANGED    Details   Lactobac no.41/Bifidobact no.7 (PROBIOTIC-10 PO) Take  by mouth daily. , Historical Med      Calcium-Cholecalciferol, D3, (Calcium 600 with Vitamin D3) 600 mg-10 mcg (400 unit) chew Take  by mouth daily. , Historical Med      ibuprofen (MOTRIN) 200 mg tablet Take 200 mg by mouth daily as needed for Pain., Historical Med      hydrOXYzine HCL (ATARAX) 25 mg tablet Take 25 mg by mouth nightly., Historical Med      pantoprazole (PROTONIX) 40 mg tablet Take 40 mg by mouth daily. , Historical Med      Cetirizine (ZyrTEC) 10 mg cap Take  by mouth., Historical Med      escitalopram oxalate (LEXAPRO) 20 mg tablet TAKE 1 TABLET BY MOUTH EVERY DAY, Historical Med           STOP taking these medications       mupirocin (BACTROBAN) 2 % ointment Comments:   Reason for Stopping:            per medical continuation form      -Follow up in office in 2 weeks      Signed:  Gasper Olson NP  Orthopaedic Nurse Practitioner    2/14/2023  2:44 PM

## 2023-02-15 ENCOUNTER — HOME CARE VISIT (OUTPATIENT)
Dept: SCHEDULING | Facility: HOME HEALTH | Age: 63
End: 2023-02-15
Payer: COMMERCIAL

## 2023-02-15 VITALS
HEART RATE: 80 BPM | RESPIRATION RATE: 16 BRPM | DIASTOLIC BLOOD PRESSURE: 62 MMHG | TEMPERATURE: 99 F | SYSTOLIC BLOOD PRESSURE: 108 MMHG | OXYGEN SATURATION: 95 %

## 2023-02-15 PROCEDURE — G0151 HHCP-SERV OF PT,EA 15 MIN: HCPCS

## 2023-02-15 NOTE — Clinical Note
Medication reconciliation was performed for Taj Theodore for her initial home care physical therapy treatment. A severe drug interaction warning was triggered by hydroxyzine HCL and escitalopram oxalate. In addition, pt is not taking Ibuprofen.   Thanks,  [address] [place] [address] Physical Therapist  [facility] - INPATIENT  [phone] (cell)  [phone] (office)

## 2023-02-15 NOTE — HOME HEALTH
Skilled reason for admission/summary of clinical condition: Pt is a pleasant 65 y.o. woman who lives in a multi-story home with 6 steps to enter with her , Pema Fernandez, and dog. Pt's  works but is available in the evenings. Pt's daughter, Mateo Voss, and friend, Renay Ford, live nearby and can assist pt if needed. Pt is temporarily sleeping in the first floor bedroom with a full bathroom. She usually sleeps on the second floor. Pt is s/p L THR, anterior approach, on 2/13/2023. She returned home yesterday, 2/14/23. She was independent with ambulation and ADLs prior to a month before her surgery when she began using a SPC due to chronic L hip pain. Pt is homebound due to severe L hip pain, LLE weakness, poor endurance, SOB with min exertion, poor balance and gait abnormalities. Pt is at risk for falls and requires an AD and supervision at all times for safety. It is extremely difficult for this pt to leave her home without max effort and assistance. Pt would benefit from PT treatments 1d1, 1w1, 2w2 to improve LLE strength, endurance, gait and balance to improve her function and safety and prevent falls. Diagnosis/PMH includes: s/p L THR, anterior approach, Adverse effect of anesthesia, Arthritis -spine, hip, At risk for sleep apnea, Chronic pain- hip, GERD, Psychiatric disorder - anxiety and depression, Seizures     Subjective: Pt reports she is \"happy to be home\". Caregiver: , Pema Fernandez, daughter, Mateo Voss and friend, Renay Ford. Caregiver assists with: Medications, Meals, Bathing, ADL, IADL, Wound Care, Transportation and Housekeeping. Caregiver is available Regularly Caregiver is present at this visit and did participate with clinician. Medications reconciled and all medications are available in the home this visit. Education was provided regarding medications: medication interactions and look alike medications. Patient/CG able to demonstrate knowledge through teach back with 100 percent accuracy.   Medications are somewhat effective at this time. Dr. Audelia Teague notified of any discrepancies/medication interactions: A severe drug interaction warning was triggered by hydroxyzine HCL and escitalopram oxalate. In addition, pt is not taking Ibuprofen. A list of reconciled medications has been given to the patient/caregiver and a copy has been uploaded to media.   High alert medication teaching on oxycodone, Opioid medication education Purpose, dose, and frequency, Proper storage Store in the original packaging and Store medicines out of reach from children, teens, and pets, Proper disposal Return unused medication to the pharmacy, if possible., Locate a medicine take-back option, mail back program or local collection receptable for drug disposal, if possible and May mix the medication in dirt, cat litter, used coffee grounds or dish detergent and place the mixture in a container or plastic bag and seal. Discard the container in the trash. , Staying hydrated, Eating high fiber diet, Potential complications such as seizures, inability or difficulty rousing patient, slow or shallow breathing, slurred speech, blue lips or finger, confusion, inability to urinate and Side effects such as dizziness, sleepiness, constipation, nausea, vomiting, itchiness, and dry mouth  High alert medication teaching on aspirin, anticoagulant therapy education; purpose, dose, and frequency, food/drug interactions, risks of co-administration with other medications such as NSAID, and herbals, bleeding prevention strategies such as the use of a soft toothbrush, gentle flossing, use of electric razor, on the potential for increased bleeding from falls and lacerations, to notify medical providers of anticoagulant therapy, consider carrying an ID card, signs and symptoms of abnormal bleeding such as unexplained bruising, dizziness/lightheadedness, red or tarry looking stool, blood in urine, blood in vomit and to call home care agency and/or physician for any problems or concerns      Home health supplies by type and quantity ordered/delivered this visit include: none  Patient/caregiver instructed on plan of care and are agreeable to plan of care at this time. Clinician reviewed orientation to home health booklet with patient/caregiver including agency phone number, agency complaint process, state hotline number, as well as joint commission's quality hotline number. Consent forms signed. Patient at risk for falls Yes:   Recommended requesting PT/OT orders due to fall risk: only PT ordered due to ortho pt  Patient response to recommended requesting of PT/OT orders: pt does not have OT orders at this time    Discharge planning discussed with patient and caregiver. Discharge planning as follows: d/c to home with assistance when goals are met, Is no longer homebound, Per physician order and When patient is no longer able to participate or progresses to SNF/Hospice Patient/caregiver did verbalize agreement with discharge planning. Patient/caregiver did verbalize agreement with discharge planning. Clinical Assessment (What this means for the patient overall and need for ongoing skilled care): Pt presents with severe L hip pain and weakness due to L THR, increasing her fall risk. Pt also presents with gait abnormalities increasing her risk for falls. Pt would benefit from PT treatments to reduce her pain symptoms, improve L hip strength, and improve her safety with ambulation to reduce her risk for falls. Written Teaching Material Utilized: provided pt with written HEP, home health handbook with falls prevention strategies, contact information, and emergency preparedness plan    Specific plan for next visit: cont with LLE strengthening, gait training, transfer training, pain management education, and instruction in HEP    Plan of care and admission to home health status called to attending physician.  The following practitioner has agreed to sign the ongoing POC- Dr. Magdaleno Fontanez, and was notified of the following: visit frequency of 1d1, 1w1, 2w2 and medication concerns as follows: A severe drug interaction warning was triggered by hydroxyzine HCL and escitalopram oxalate. In addition, pt is not taking Ibuprofen. Interdisciplinary communication with: Gregorio Shearer, to notify her of pt POC and progress    PCP: Dr. Milly Will  Next scheduled doctor appointment: TBD- 2 week f/u  Patient/Caregiver instructed to keep follow up appointment because lack of follow through with physician appointments could result in discontinuation of home care services for non-compliance. Patient/Caregiver verbalize knowledge of above through teach back with 100 percent accuracy. Emergency Preparedness: Patient/Caregiver instructed in the following:  Have one gallon of water per person for at least 3 days on hand. Have non-perishable food for at least 3 days that do not need to be cooked. Have flashlights and batteries. Charge your cell phones and any back up lithium batteries for your cell phones. Have medication for a week in your home. Make sure you have a caregiver in the home to provide care in case your home health nurse cannot get to your house. Make sure you have all of your paperwork i.e. written emergency preparedness plan, Identification, insurance cards, DME phone number, physician and pharmacy phone number, agency phone number, and your medications in one place for easy access and in a zip lock bag to protect them. Take your Admission Handbook, written emergency preparedness plan, written medication list and folder if you relocate in the event of an emergency, if possible. Call agency if you relocate so we can contact you. Patient/Caregiver verbalize knowledge of above through teach back with 100 percent accuracy.

## 2023-02-17 ENCOUNTER — HOME CARE VISIT (OUTPATIENT)
Dept: SCHEDULING | Facility: HOME HEALTH | Age: 63
End: 2023-02-17
Payer: COMMERCIAL

## 2023-02-17 VITALS
RESPIRATION RATE: 16 BRPM | OXYGEN SATURATION: 98 % | SYSTOLIC BLOOD PRESSURE: 126 MMHG | TEMPERATURE: 98.5 F | HEART RATE: 74 BPM | DIASTOLIC BLOOD PRESSURE: 68 MMHG

## 2023-02-17 PROCEDURE — G0151 HHCP-SERV OF PT,EA 15 MIN: HCPCS

## 2023-02-17 NOTE — Clinical Note
she did well today-pain is less, I progressed her standing exercises and put tennis balls on the walker to help ease the walking in her home. she's expecting you monday but knows you will call with a time!

## 2023-02-17 NOTE — HOME HEALTH
Subjective: patient reports pain better over all but having most discomfort at night. Falls since last visit NO(if yes complete the Fall Tracking Form and include bsrifallreport):  Caregiver involvement changes: none  Home health supplies by type and quantity ordered/delivered this visit include: n/a    Clinician asked if patient has had any physician contact since last home care visit and patient states: N/A  Clinician asked if patient has any new or changed medications and patient states:  N/A    If Yes, were medications reconciled? N/A    Was the certifying physician notified of changes in medications? N/A      Clinical assessment (what this visit means for the patient overall and need for ongoing skilled care) and progress or lack of progress towards SPECIFIC goals: patient having less pain allowing improved mobility but continues to have generalized left LE weakness impacting safe gait, stairs and transfers. Continued PT needed for strengthening and gait training to return to independent function with all mobility. Written Teaching Material Utilized: HEP    Interdisciplinary communication with:   PT for the purpose of POC collaboration    Discharge planning as follows:  Will discharge when the patient has reached their maximum functional potential and maximum safety in their home    Specific plan for next visit: gait training, continue strengthening s/p left THR

## 2023-02-20 ENCOUNTER — DOCUMENTATION ONLY (OUTPATIENT)
Dept: ORTHOPEDIC SURGERY | Age: 63
End: 2023-02-20

## 2023-02-20 ENCOUNTER — HOME CARE VISIT (OUTPATIENT)
Dept: SCHEDULING | Facility: HOME HEALTH | Age: 63
End: 2023-02-20
Payer: COMMERCIAL

## 2023-02-20 VITALS
SYSTOLIC BLOOD PRESSURE: 108 MMHG | TEMPERATURE: 98.2 F | RESPIRATION RATE: 16 BRPM | HEART RATE: 92 BPM | OXYGEN SATURATION: 96 % | DIASTOLIC BLOOD PRESSURE: 64 MMHG

## 2023-02-20 PROCEDURE — G0151 HHCP-SERV OF PT,EA 15 MIN: HCPCS

## 2023-02-20 NOTE — HOME HEALTH
Subjective: Pt reports she is \"getting better\". Pt does not want to sleep upstairs yet. Pt continues to sleep in the first floor bedroom.   Falls since last visit:no falls  Caregiver involvement changes: pt's  is home each evening, her daughter and friend can assist during the day if needed  Home health supplies by type and quantity ordered/delivered this visit include: none    Clinician asked if patient has had any physician contact since last home care visit and patient states: no  Clinician asked if patient has any new or changed medications and patient states: no med changes  If Yes, were medications reconciled? n/a  Was the certifying physician notified of changes in medications? n/a    Clinical assessment (what this visit means for the patient overall and need for ongoing skilled care) and progress or lack of progress towards SPECIFIC goals: Pt understands high risk med management and has achieved this goal. Pt was able to perform additional ther ex however she did have increased hip pain with standing hip abd, indicating pt's strength is improving making steady progress toward her strength goal however she is making slow progress toward her pain goal. Pt was able to progress gait training to using her SPC today however she cont to have gait deficits indicating fall risk, making slow but steady progress toward her gait goal. Pt was able to navigate the stairs for gait training however she cont to require assistance for safety due to fall risk, making steady progress toward her stair goal. Pt's transfers are improving and she is making good progress toward her transfer goal.     Written Teaching Material Utilized: provided pt with home exercise program, handbook with falls prevention strategies, managment phone numbers, stoplight tool    Interdisciplinary communication with: Iris Cain PT, regarding pt progress    Discharge planning as follows: d/c to home with assistance when goals are met, Is no longer homebound, Per physician order and When patient is no longer able to participate or progresses to SNF/Hospice Patient/caregiver did verbalize agreement with discharge planning. Specific plan for next visit: cont with LLE strengthening, gait training, transfer training, and stair training. Begin gait training outside if the weather is nice and pt is willing.

## 2023-02-21 ENCOUNTER — TELEPHONE (OUTPATIENT)
Dept: ORTHOPEDIC SURGERY | Age: 63
End: 2023-02-21

## 2023-02-21 NOTE — TELEPHONE ENCOUNTER
Spoke with pt who stated she did more yesterday and had a \"rough\" PT. I advised her to take it easy today, that usually the day after a hard day of PT can make you sore and a little swollen, especially if she's been moving around a lot more or standing. I did inform her that even her knees and ankles could swell as well, the more she's up and moving, and if this happens to not be alarmed.

## 2023-02-21 NOTE — TELEPHONE ENCOUNTER
Patient is requesting a return phone call from the [facility] . She states her left leg started swelling last night but is not warm to the touch nor does she have a fever. Patient had Left Total Hip Surgery on 2/13/23.  Patient can be reached at [phone]

## 2023-02-22 ENCOUNTER — HOME CARE VISIT (OUTPATIENT)
Dept: SCHEDULING | Facility: HOME HEALTH | Age: 63
End: 2023-02-22
Payer: COMMERCIAL

## 2023-02-22 VITALS
TEMPERATURE: 97.9 F | DIASTOLIC BLOOD PRESSURE: 64 MMHG | OXYGEN SATURATION: 95 % | SYSTOLIC BLOOD PRESSURE: 114 MMHG | HEART RATE: 64 BPM | RESPIRATION RATE: 16 BRPM

## 2023-02-22 PROCEDURE — G0151 HHCP-SERV OF PT,EA 15 MIN: HCPCS

## 2023-02-22 NOTE — HOME HEALTH
Subjective: Pt is concerned about the swelling in her LLE. Pt sits in her chair in the living room with her foot in a dependent position. PT re-educated pt in the benefits of elevating her LLE to help reduce swelling. PT checked LLE and no s/sx of DVT, Elke's sign neg. PT assisted pt in elevating her LLE by using her ottoman at the end of today's treatment. Falls since last visit:no falls   Caregiver involvement changes: pt's  is home each evening, her daughter and friend can assist during the day if needed   Home health supplies by type and quantity ordered/delivered this visit include: none   Clinician asked if patient has had any physician contact since last home care visit and patient states: no   Clinician asked if patient has any new or changed medications and patient states: no med changes   If Yes, were medications reconciled? n/a   Was the certifying physician notified of changes in medications? n/a   Clinical assessment (what this visit means for the patient overall and need for ongoing skilled care) and progress or lack of progress towards SPECIFIC goals: Pt was able to perform additional ther ex without an increase in hip pain today, indicating pt's strength is improving making steady progress toward her strength goal and steady progress toward her pain goal. Pt was able to progress gait training to using her [address] outside today however she cont to have gait deficits indicating fall risk, making slow but steady progress toward her gait goal. Pt was able to navigate the stairs into and out of her garage for gait training with less assistance, making steady progress toward her stair goal. Pt is safe with her transfers, achieving her transfer goal. Pt cont to benefit from PT treatments to improve LLE strength, endurance, gait and balance to improve her function and safety and prevent falls.     Written Teaching Material Utilized: provided pt with home exercise program, handbook with falls prevention strategies, managment phone numbers, stoplight tool   Interdisciplinary communication with: none   Discharge planning as follows: d/c to home with assistance when goals are met, Is no longer homebound, Per physician order and When patient is no longer able to participate or progresses to SNF/Hospice Patient/caregiver did verbalize agreement with discharge planning. Specific plan for next visit: cont with LLE strengthening, gait training, and stair training.

## 2023-02-27 ENCOUNTER — HOME CARE VISIT (OUTPATIENT)
Dept: SCHEDULING | Facility: HOME HEALTH | Age: 63
End: 2023-02-27
Payer: COMMERCIAL

## 2023-02-27 VITALS
HEART RATE: 64 BPM | DIASTOLIC BLOOD PRESSURE: 62 MMHG | RESPIRATION RATE: 16 BRPM | SYSTOLIC BLOOD PRESSURE: 108 MMHG | OXYGEN SATURATION: 96 % | TEMPERATURE: 98.9 F

## 2023-02-27 PROCEDURE — G0151 HHCP-SERV OF PT,EA 15 MIN: HCPCS

## 2023-02-27 NOTE — HOME HEALTH
Subjective: Pt reports she has been \"feeling better\" and she reports less overall pain at her L hip. Pt reports she has been performing her HEP. Falls since last visit:no falls   Caregiver involvement changes: pt's  is home each evening, her daughter and friend can assist during the day if needed   Home health supplies by type and quantity ordered/delivered this visit include: none   Clinician asked if patient has had any physician contact since last home care visit and patient states: no   Clinician asked if patient has any new or changed medications and patient states: no med changes   If Yes, were medications reconciled? n/a   Was the certifying physician notified of changes in medications? n/a   Clinical assessment (what this visit means for the patient overall and need for ongoing skilled care) and progress or lack of progress towards SPECIFIC goals: Pt was able to perform additional ther ex without an increase in hip pain today, indicating pt's strength is improving making steady progress toward her strength goal and steady progress toward her pain goal. Pt was able to progress gait training to using her [address] farther outside today however she cont to have gait deficits indicating fall risk, making slow but steady progress toward her gait goal. Pt was able to navigate the stairs with a reciprocal pattern, making steady progress toward her stair goal. Pt cont to benefit from PT treatments to improve LLE strength, endurance, gait and balance to improve her function and safety and prevent falls.    Written Teaching Material Utilized: provided pt with home exercise program, handbook with falls prevention strategies, managment phone numbers, stoplight tool   Interdisciplinary communication with: none   Discharge planning as follows: d/c to home with assistance when goals are met, Is no longer homebound, Per physician order and When patient is no longer able to participate or progresses to SNF/Hospice Patient/caregiver did verbalize agreement with discharge planning. Specific plan for next visit: cont with LLE strengthening, gait training, and stair training, plan to discharge next treatment.

## 2023-02-28 DIAGNOSIS — Z96.642 AFTERCARE FOLLOWING LEFT HIP JOINT REPLACEMENT SURGERY: Primary | ICD-10-CM

## 2023-02-28 DIAGNOSIS — Z47.1 AFTERCARE FOLLOWING LEFT HIP JOINT REPLACEMENT SURGERY: Primary | ICD-10-CM

## 2023-03-01 ENCOUNTER — OFFICE VISIT (OUTPATIENT)
Dept: ORTHOPEDIC SURGERY | Age: 63
End: 2023-03-01
Payer: COMMERCIAL

## 2023-03-01 ENCOUNTER — HOME CARE VISIT (OUTPATIENT)
Dept: SCHEDULING | Facility: HOME HEALTH | Age: 63
End: 2023-03-01
Payer: COMMERCIAL

## 2023-03-01 ENCOUNTER — HOSPITAL ENCOUNTER (OUTPATIENT)
Dept: GENERAL RADIOLOGY | Age: 63
Discharge: HOME | End: 2023-03-01
Payer: COMMERCIAL

## 2023-03-01 VITALS
SYSTOLIC BLOOD PRESSURE: 108 MMHG | OXYGEN SATURATION: 97 % | RESPIRATION RATE: 16 BRPM | TEMPERATURE: 98 F | WEIGHT: 176 LBS | HEART RATE: 82 BPM | HEIGHT: 65 IN | DIASTOLIC BLOOD PRESSURE: 72 MMHG | BODY MASS INDEX: 29.32 KG/M2

## 2023-03-01 VITALS
DIASTOLIC BLOOD PRESSURE: 60 MMHG | HEART RATE: 61 BPM | SYSTOLIC BLOOD PRESSURE: 114 MMHG | TEMPERATURE: 98.2 F | RESPIRATION RATE: 16 BRPM | OXYGEN SATURATION: 97 %

## 2023-03-01 DIAGNOSIS — Z47.1 AFTERCARE FOLLOWING LEFT HIP JOINT REPLACEMENT SURGERY: ICD-10-CM

## 2023-03-01 DIAGNOSIS — Z47.1 AFTERCARE FOLLOWING LEFT HIP JOINT REPLACEMENT SURGERY: Primary | ICD-10-CM

## 2023-03-01 DIAGNOSIS — Z96.642 AFTERCARE FOLLOWING LEFT HIP JOINT REPLACEMENT SURGERY: ICD-10-CM

## 2023-03-01 DIAGNOSIS — Z96.642 AFTERCARE FOLLOWING LEFT HIP JOINT REPLACEMENT SURGERY: Primary | ICD-10-CM

## 2023-03-01 PROCEDURE — 99024 POSTOP FOLLOW-UP VISIT: CPT | Performed by: ORTHOPAEDIC SURGERY

## 2023-03-01 PROCEDURE — 73502 X-RAY EXAM HIP UNI 2-3 VIEWS: CPT

## 2023-03-01 PROCEDURE — G0151 HHCP-SERV OF PT,EA 15 MIN: HCPCS

## 2023-03-01 RX ORDER — OXYCODONE HYDROCHLORIDE 5 MG/1
5 TABLET ORAL
Qty: 28 TABLET | Refills: 0 | Status: SHIPPED | OUTPATIENT
Start: 2023-03-01 | End: 2023-03-08

## 2023-03-01 NOTE — PROGRESS NOTES
Lauro Vargas  is here for a follow up visit from a total hip arthroplasty on the left side. The patient is doing well, with no medical complications since discharge. Pain has been appropriate since surgery,and the patient is progressing well with physical therapy. Current Outpatient Medications on File Prior to Visit   Medication Sig Dispense Refill    aspirin delayed-release 325 mg tablet Take 1 Tablet by mouth two (2) times a day for 30 days. 60 Tablet 0    Lactobac no.41/Bifidobact no.7 (PROBIOTIC-10 PO) Take  by mouth daily. Calcium-Cholecalciferol, D3, (Calcium 600 with Vitamin D3) 600 mg-10 mcg (400 unit) chew Take  by mouth daily. ibuprofen (MOTRIN) 200 mg tablet Take 200 mg by mouth daily as needed for Pain.      hydrOXYzine HCL (ATARAX) 25 mg tablet Take 25 mg by mouth nightly. pantoprazole (PROTONIX) 40 mg tablet Take 40 mg by mouth daily. Cetirizine (ZyrTEC) 10 mg cap Take  by mouth.      escitalopram oxalate (LEXAPRO) 20 mg tablet TAKE 1 TABLET BY MOUTH EVERY DAY       No current facility-administered medications on file prior to visit. ROS:  General: denies agitation, major chest pain, unexpected weakness  Pain in the hip is managed with tylenol, oxycodone. Skin: healing wound is without issue. Strength: appropriate weakness of involved extremity is resolving since surgery      Physical Examination:    There were no vitals taken for this visit. Skin: no significant drainage, no wound dehiscence  Sensation intact to light touch at level of wound and distally. Lateral femoral cutaneous nerve function is intact  Strength is 4/5 with hip flexion and abduction  Leg lengths are clinically equal  Distal swelling is noted, but appropriate for postoperative course  Distal capillary refill less than 2 seconds      Imaging:    Postoperative xrays are reviewed, they indicate a left total hip arthroplasty in excellent position without evidence of loosening or failure.   Leg lengths are equal through the hip.       Assessment: Status post left total hip arthroplasty    Plan:  Patient will continue physical therapy, with the goal of outpatient therapy and then home exercise program as soon as is possible  Wound care and dental prophylaxis instructions were reviewed  Continue to weight bear as tolerated without restriction, except to keep to the positions of comfort  Deep Venous Thrombosis Prophylaxis: aspirin  Follow up will be 4 weeks,  left hip xrays on follow up

## 2023-03-01 NOTE — PROGRESS NOTES
Identified pt with two pt identifiers (name and ). Reviewed chart in preparation for visit and have obtained necessary documentation. Cali Nelson is a 59 y.o. female  Chief Complaint   Patient presents with    Surgical Follow-up     LT Hip     Visit Vitals  /72 (BP 1 Location: Left upper arm, BP Patient Position: Sitting, BP Cuff Size: Adult)   Pulse 82   Temp 98 °F (36.7 °C) (Tympanic)   Resp 16   Ht 5' 5\" (1.651 m)   Wt 176 lb (79.8 kg)   SpO2 97%   BMI 29.29 kg/m²     1. Have you been to the ER, urgent care clinic since your last visit? Hospitalized since your last visit? No    2. Have you seen or consulted any other health care providers outside of the [address] [place] [address] since your last visit? Include any pap smears or colon screening.  No

## 2023-03-01 NOTE — HOME HEALTH
Subjective: Pt reports she has her f/u appointment with Dr. Luiz Rick this afternoon. Pt reports her LLE has less overall pain. Pt states she has been doing her HEP. Falls since last visit:no falls   Caregiver involvement changes: pt's  is home each evening, her daughter and friend can assist during the day if needed   Home health supplies by type and quantity ordered/delivered this visit include: none   Clinician asked if patient has had any physician contact since last home care visit and patient states: no   Clinician asked if patient has any new or changed medications and patient states: no med changes   If Yes, were medications reconciled? yes- d/c visit  Was the certifying physician notified of changes in medications? n/a   Clinical assessment (what this visit means for the patient overall and need for ongoing skilled care) and progress or lack of progress towards SPECIFIC goals: Pt was able to perform all ther ex without an increase in hip pain today, achieving her strength goal and her pain goal. Pt was able to progress gait training to holding her [address] in her hand and placing it down if needed, achieving her gait goal. Pt cont to be at risk for falls however her fall risk has reduced as evidenced by the following:  [place] 21/28, this is improved from 12/28 at [facility], however pt is still at risk for falls   [facility] 10- 5 this is the same as PT SOC when pt was 6, she is still at risk for falls   30 sec sit to stand test modified with use of BUEs- 6 transfers, this is improved from 1 transfer at [facility], however pt is still at risk for falls  Pt has achieved all PT goals and is to be discharged to her HEP.    Written Teaching Material Utilized: provided pt with home exercise program, handbook with falls prevention strategies, managment phone numbers, stoplight tool, discharge instructions, med list   Interdisciplinary communication with: none   Discharge planning as follows: d/c to home with assistance as goals are met

## 2023-03-24 DIAGNOSIS — Z96.642 AFTERCARE FOLLOWING LEFT HIP JOINT REPLACEMENT SURGERY: Primary | ICD-10-CM

## 2023-03-24 DIAGNOSIS — Z47.1 AFTERCARE FOLLOWING LEFT HIP JOINT REPLACEMENT SURGERY: Primary | ICD-10-CM

## 2023-03-31 ENCOUNTER — OFFICE VISIT (OUTPATIENT)
Dept: ORTHOPEDIC SURGERY | Age: 63
End: 2023-03-31
Payer: COMMERCIAL

## 2023-03-31 ENCOUNTER — HOSPITAL ENCOUNTER (OUTPATIENT)
Dept: GENERAL RADIOLOGY | Age: 63
Discharge: HOME | End: 2023-03-31
Payer: COMMERCIAL

## 2023-03-31 VITALS
HEART RATE: 67 BPM | OXYGEN SATURATION: 96 % | SYSTOLIC BLOOD PRESSURE: 114 MMHG | BODY MASS INDEX: 29.46 KG/M2 | RESPIRATION RATE: 17 BRPM | WEIGHT: 176.8 LBS | HEIGHT: 65 IN | TEMPERATURE: 98.4 F | DIASTOLIC BLOOD PRESSURE: 81 MMHG

## 2023-03-31 DIAGNOSIS — Z47.1 AFTERCARE FOLLOWING LEFT HIP JOINT REPLACEMENT SURGERY: Primary | ICD-10-CM

## 2023-03-31 DIAGNOSIS — Z96.642 AFTERCARE FOLLOWING LEFT HIP JOINT REPLACEMENT SURGERY: Primary | ICD-10-CM

## 2023-03-31 DIAGNOSIS — Z96.642 AFTERCARE FOLLOWING LEFT HIP JOINT REPLACEMENT SURGERY: ICD-10-CM

## 2023-03-31 DIAGNOSIS — Z47.1 AFTERCARE FOLLOWING LEFT HIP JOINT REPLACEMENT SURGERY: ICD-10-CM

## 2023-03-31 PROCEDURE — 73502 X-RAY EXAM HIP UNI 2-3 VIEWS: CPT

## 2023-03-31 PROCEDURE — 99024 POSTOP FOLLOW-UP VISIT: CPT | Performed by: ORTHOPAEDIC SURGERY

## 2023-03-31 NOTE — PROGRESS NOTES
Identified pt with two pt identifiers (name and ). Reviewed chart in preparation for visit and have obtained necessary documentation. Mathew Maohney is a 59 y.o. female  Chief Complaint   Patient presents with    Surgical Follow-up     Lt Hip     Visit Vitals  /81 (BP 1 Location: Right upper arm, BP Patient Position: Sitting, BP Cuff Size: Large adult)   Pulse 67   Temp 98.4 °F (36.9 °C) (Tympanic)   Resp 17   Ht 5' 5\" (1.651 m)   Wt 176 lb 12.8 oz (80.2 kg)   SpO2 96%   BMI 29.42 kg/m²     1. Have you been to the ER, urgent care clinic since your last visit? Hospitalized since your last visit? No    2. Have you seen or consulted any other health care providers outside of the [address] [place] [address] since your last visit? Include any pap smears or colon screening.  No

## 2023-04-01 NOTE — PROGRESS NOTES
Severa Mainland  is here for a follow up visit from a total hip arthroplasty on the left side. The patient is doing well, with no medical complications since discharge. Pain has been appropriate since surgery,and the patient is progressing well with physical therapy. Current Outpatient Medications on File Prior to Visit   Medication Sig Dispense Refill    Lactobac no.41/Bifidobact no.7 (PROBIOTIC-10 PO) Take 1 Capsule by mouth daily. Calcium-Cholecalciferol, D3, (Calcium 600 with Vitamin D3) 600 mg-10 mcg (400 unit) chew Take 600 mg by mouth daily. ibuprofen (MOTRIN) 200 mg tablet Take 200 mg by mouth daily as needed for Pain.      hydrOXYzine HCL (ATARAX) 25 mg tablet Take 25 mg by mouth nightly. pantoprazole (PROTONIX) 40 mg tablet Take 40 mg by mouth daily. Cetirizine (ZyrTEC) 10 mg cap Take 10 mg by mouth daily. escitalopram oxalate (LEXAPRO) 20 mg tablet TAKE 1 TABLET BY MOUTH EVERY DAY       No current facility-administered medications on file prior to visit. ROS:  General: denies agitation, major chest pain, unexpected weakness  Pain in the hip is managed with no medication. Skin: healing wound is without issue. Strength: appropriate weakness of involved extremity is resolving since surgery      Physical Examination:    Blood pressure 114/81, pulse 67, temperature 98.4 °F (36.9 °C), temperature source Tympanic, resp. rate 17, height 5' 5\" (1.651 m), weight 176 lb 12.8 oz (80.2 kg), SpO2 96 %. Skin: no significant drainage, no wound dehiscence  Sensation intact to light touch at level of wound and distally.    Lateral femoral cutaneous nerve function is intact  Strength is 5/5 with hip flexion and abduction  Leg lengths are clinically equal  Distal swelling is noted, but appropriate for postoperative course  Distal capillary refill less than 2 seconds      Imaging:    Postoperative xrays are reviewed, they indicate a left total hip arthroplasty in excellent position without evidence of loosening or failure. Leg lengths are equal through the hip.       Assessment: Status post left total hip arthroplasty    Plan:  Patient will continue physical therapy, with the goal of outpatient therapy and then home exercise program as soon as is possible  Wound care and dental prophylaxis instructions were reviewed  Continue to weight bear as tolerated without restriction, except to keep to the positions of comfort  Deep Venous Thrombosis Prophylaxis: none  Follow up will be at [facility],  left hip xrays on follow up

## 2024-02-13 NOTE — ANESTHESIA PREPROCEDURE EVALUATION
Relevant Problems   No relevant active problems       Anesthetic History   No history of anesthetic complications            Review of Systems / Medical History  Pertinent labs reviewed    Pulmonary  Within defined limits                 Neuro/Psych     seizures: well controlled    Psychiatric history     Cardiovascular  Within defined limits                Exercise tolerance: >4 METS     GI/Hepatic/Renal     GERD           Endo/Other        Arthritis     Other Findings              Physical Exam    Airway  Mallampati: II  TM Distance: > 6 cm  Neck ROM: normal range of motion   Mouth opening: Normal     Cardiovascular  Regular rate and rhythm,  S1 and S2 normal,  no murmur, click, rub, or gallop  Rhythm: regular  Rate: normal         Dental  No notable dental hx       Pulmonary  Breath sounds clear to auscultation               Abdominal  GI exam deferred       Other Findings            Anesthetic Plan    ASA: 2  Anesthesia type: spinal          Induction: Intravenous  Anesthetic plan and risks discussed with: Patient Received positive nose culture for MSSA. Rx for mupirocin ointment sent to pt's pharmacy. Spoke with pt and reinforced how to use medication as directed. Pt verbalized understanding. A1c 9.0. Pt made aware. Scheduled for [facility] on 2/16. Advised to get referral for Endo _ Needs Endo clearance

## 2024-02-20 ENCOUNTER — OFFICE VISIT (OUTPATIENT)
Age: 64
End: 2024-02-20
Payer: COMMERCIAL

## 2024-02-20 VITALS — HEIGHT: 65 IN | BODY MASS INDEX: 29.42 KG/M2

## 2024-02-20 DIAGNOSIS — S93.492A SPRAIN OF OTHER LIGAMENT OF LEFT ANKLE, INITIAL ENCOUNTER: ICD-10-CM

## 2024-02-20 DIAGNOSIS — Z96.642 PRESENCE OF LEFT ARTIFICIAL HIP JOINT: ICD-10-CM

## 2024-02-20 DIAGNOSIS — M25.572 LEFT ANKLE PAIN, UNSPECIFIED CHRONICITY: Primary | ICD-10-CM

## 2024-02-20 PROCEDURE — 99214 OFFICE O/P EST MOD 30 MIN: CPT | Performed by: ORTHOPAEDIC SURGERY

## 2024-02-20 RX ORDER — AMOXICILLIN AND CLAVULANATE POTASSIUM 500; 125 MG/1; MG/1
4 TABLET, FILM COATED ORAL ONCE
Qty: 4 TABLET | Refills: 2 | Status: SHIPPED | OUTPATIENT
Start: 2024-02-20 | End: 2024-02-20

## 2024-02-20 ASSESSMENT — PATIENT HEALTH QUESTIONNAIRE - PHQ9
SUM OF ALL RESPONSES TO PHQ QUESTIONS 1-9: 0
1. LITTLE INTEREST OR PLEASURE IN DOING THINGS: 0
2. FEELING DOWN, DEPRESSED OR HOPELESS: 0
SUM OF ALL RESPONSES TO PHQ QUESTIONS 1-9: 0
SUM OF ALL RESPONSES TO PHQ QUESTIONS 1-9: 0
SUM OF ALL RESPONSES TO PHQ9 QUESTIONS 1 & 2: 0
SUM OF ALL RESPONSES TO PHQ QUESTIONS 1-9: 0

## 2024-02-20 NOTE — PROGRESS NOTES
Identified pt with two pt identifiers (name and ). Reviewed chart in preparation for visit and have obtained necessary documentation.    Mary Anne Brown is a 63 y.o. female Hip Pain, Follow-up (Left Hip ERICA ), and Pain (Left Foot Pain )  .    Vitals:    24 1040   Height: 1.651 m (5' 5\")          1. Have you been to the ER, urgent care clinic since your last visit?  Hospitalized since your last visit?  no     2. Have you seen or consulted any other health care providers outside of the [facility] since your last visit?  Include any pap smears or colon screening.  no  
unless noted in specialty exam.    Extremities:      Left upper extremity: Full active and passive range of motion without pain, deformity, no open wound, strength 5/5 in all major muscle groups.    Right upper extremity: Full active and passive range of motion without pain, deformity, no open wound, strength 5/5 in all major muscle groups.    Right lower extremity: Full active and passive range of motion without pain, deformity, no open wound, strength 5/5 in all major muscle groups.    Left lower extremity:  Well healed surgical wound is noted.  Patient ambulates with no device and no limp.  No deformity is noted.   Circumduction of the hip does not cause arthritic pain as it had preoperatively.  Strength testing is indicative of 5/5 strength at hip flexion, extension,  5/5 knee flexion and extension, tibialis anterior, EHL, and FHL.  Sensation is intact to light touch in the L1-S1 dermatomes with lateral femoral cutaneous nerve function intact.  Capillary refill is less than 2 seconds distally.  Ankle has tenderness to palpation at the ATFL.    Diagnostics:    Pertinent Xrays:  Xrays are ordered and reviewed by myself of the left hip.  They indicate a hip arthroplasty in excellent position without evidence of loosening or failure.  Leg lengths are equal.  X-rays also ordered and reviewed by myself of the left ankle indicate no fractures, dislocation, osseous abnormalities.      Assessment: long term follow up, status post left hip arthroplasty    Plan:   will continue physical therapy exercises.  We discussed the importance of continued vigilance to this end.  We also discussed dental prophylaxis and safe activities and reasonable life choices regarding activity level.  Patient stated their understanding.  Pain control for now will be as needed only, and will be patient directed.       will follow up in 3 years.  Left hip x-rays on follow up.    We also did discuss that sprains are inherently

## (undated) DEVICE — DRAPE,U/ SHT,SPLIT,PLAS,STERIL: Brand: MEDLINE

## (undated) DEVICE — SOLUTION IRRIG 1000ML STRL H2O USP PLAS POUR BTL

## (undated) DEVICE — ADHESIVE SKIN CLSR 0.7ML TOP DERMBND ADV

## (undated) DEVICE — STRIP,CLOSURE,WOUND,MEDI-STRIP,1/2X4: Brand: MEDLINE

## (undated) DEVICE — Device: Brand: JELCO

## (undated) DEVICE — SUTURE VCRL SZ 2-0 L36IN ABSRB UD L36MM CT-1 1/2 CIR J945H

## (undated) DEVICE — HOOD: Brand: FLYTE

## (undated) DEVICE — SOLUTION SURG PREP 26 CC PURPREP

## (undated) DEVICE — TRANSFER SET 3": Brand: MEDLINE INDUSTRIES, INC.

## (undated) DEVICE — SNAP KOVER: Brand: UNBRANDED

## (undated) DEVICE — GOWN,SIRUS,NONRNF,XLN/2XL,18/CS: Brand: MEDLINE

## (undated) DEVICE — SHEET, DRAPE, SPLIT, STERILE: Brand: MEDLINE

## (undated) DEVICE — BLADE ES L6IN ELASTOMERIC COAT EXT DURABLE BEND UPTO 90DEG

## (undated) DEVICE — GLOVE SURG SZ 85 L12IN FNGR THK79MIL GRN LTX FREE

## (undated) DEVICE — YANKAUER,SMOOTH HANDLE,HIGH CAPACITY: Brand: MEDLINE INDUSTRIES, INC.

## (undated) DEVICE — TOTAL JOINT-MRMC: Brand: MEDLINE INDUSTRIES, INC.

## (undated) DEVICE — GLOVE SURG SZ 85 L12IN FNGR ORTHO 126MIL CRM LTX FREE

## (undated) DEVICE — SUTURE VCRL SZ 1 L36IN ABSRB UD L36MM CT-1 1/2 CIR J947H

## (undated) DEVICE — STRYKER PERFORMANCE SERIES SAGITTAL BLADE: Brand: STRYKER PERFORMANCE SERIES

## (undated) DEVICE — 3M™ IOBAN™ 2 ANTIMICROBIAL INCISE DRAPE 6651EZ: Brand: IOBAN™ 2

## (undated) DEVICE — SOLUTION IRRIG 1000ML 0.9% SOD CHL USP POUR PLAS BTL

## (undated) DEVICE — SOLUTION SCRB 4% CHG RED ANTIMIC SKIN CLN PREOPERATIVE DISP

## (undated) DEVICE — 3M™ TEGADERM™ TRANSPARENT FILM DRESSING FRAME STYLE, 1626W, 4 IN X 4-3/4 IN (10 CM X 12 CM), 50/CT 4CT/CASE: Brand: 3M™ TEGADERM™

## (undated) DEVICE — SUTURE MCRYL SZ 3-0 L27IN ABSRB UD L24MM PS-1 3/8 CIR PRIM Y936H

## (undated) DEVICE — 450 ML BOTTLE OF 0.05% CHLORHEXIDINE GLUCONATE IN 99.95% STERILE WATER FOR IRRIGATION, USP AND APPLICATOR.: Brand: IRRISEPT ANTIMICROBIAL WOUND LAVAGE

## (undated) DEVICE — KIT POS FOAM HANA TBL